# Patient Record
Sex: MALE | Race: WHITE | NOT HISPANIC OR LATINO | Employment: FULL TIME | URBAN - METROPOLITAN AREA
[De-identification: names, ages, dates, MRNs, and addresses within clinical notes are randomized per-mention and may not be internally consistent; named-entity substitution may affect disease eponyms.]

---

## 2023-03-22 LAB — PROSTATE SPECIFIC AG (NG/ML) IN SER/PLAS: <0.1 NG/ML (ref 0–4)

## 2023-07-12 ENCOUNTER — TELEPHONE (OUTPATIENT)
Dept: PRIMARY CARE | Facility: CLINIC | Age: 78
End: 2023-07-12

## 2023-07-31 RX ORDER — MINERAL OIL
1 ENEMA (ML) RECTAL DAILY
COMMUNITY

## 2023-07-31 RX ORDER — MONTELUKAST SODIUM 10 MG/1
1 TABLET ORAL DAILY
COMMUNITY

## 2023-07-31 RX ORDER — MELATONIN 3 MG
1 CAPSULE ORAL NIGHTLY PRN
COMMUNITY
Start: 2023-06-16

## 2023-07-31 RX ORDER — SIMVASTATIN 80 MG/1
40 TABLET, FILM COATED ORAL
COMMUNITY

## 2023-07-31 RX ORDER — VIT A/VIT C/VIT E/ZINC/COPPER 2148-113
1 TABLET ORAL EVERY 12 HOURS
COMMUNITY
Start: 2014-02-18

## 2023-07-31 RX ORDER — FLUNISOLIDE 0.25 MG/ML
2 SOLUTION NASAL
COMMUNITY
Start: 2021-11-18

## 2023-07-31 RX ORDER — TIMOLOL MALEATE 5 MG/ML
SOLUTION/ DROPS OPHTHALMIC EVERY 12 HOURS
COMMUNITY
Start: 2019-03-05

## 2023-08-01 NOTE — PROGRESS NOTES
Subjective   Reason for Visit: Sergio Newton is an 78 y.o. male here for a Medicare Wellness visit.               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    He also was noted to be bradycardic when seeing his primary care physician at the [facility].  He did obtain a cardiology consult in mid June with Dr. Baig  Echocardiogram revealed a moderately enlarged right ventricle but no other abnormalities.  Stress test was negative for ischemia..  The patient reports no chest pain, shortness of breath, headache, presyncope.    The patient reports no paroxysms of sneezing over the past year or so.  He still reports continued chronic intermittent episodes of rhinorrhea times years.  He reports that the episodes usually occur in the morning and resolve after he blows his nose.  No other associated symptoms.    He reports no involuntary nighttime episodes of urinary incontinence over the past year.  Over the past year, he reports fewer episodes of urinary incontinence when standing up quickly or lifting.  He reports no other associated symptoms.    He reports no episodes of pain in the knees over the past several months as he has been more physically active walking up and down stairs at home.    No other new complaints.  Patient Care Team:  Luis Rose MD as PCP - General  Luis Rose MD as PCP - [employer] PCP     Review of Systems  All systems have been reviewed and are normal except as previously noted  Objective   Vitals:  There were no vitals taken for this visit.      Physical Exam  Head-palpation revealed no tenderness over the maxillary or frontal sinuses  Eyes-extraocular movements intact pupils equal and reactive to light; fundi not well visualized.   Ears-palpation of pinnas and tragus revealed no tenderness. External auditory canals not erythematous or swollen.  Right TM not  visualized secondary to impacted cerumen..  Left TM clear  Nose-turbinates not erythematous or swollen; no septal deviation noted  Mouth-posterior pharynx is not erythematous or swollen. Tonsillar pillars appeared normal no exudates  Neck no lymphadenopathy. Thyroid gland not enlarged. , No bruits  Breasts-bilateral gynecomastia noted.  Lungs-clear to auscultation bilaterally  Cardiac-bradycardic, rhythm regular no murmurs no JVD  Abdomen-soft nondistended normal active bowel sounds. There is a 2 x 2 centimeter mass located in the umbilicus. There is a 10 x 10 cm mass located in the epigastrium. Palpation of both regions revealed no tenderness. No tenderness or masses noted throughout the rest of the abdomen. Liver percussed to 9 cm in total span  Pulses 2+ bilaterally  Extremities-no peripheral edema  Musculoskeletal  Cervical spine-no erythema or swelling. Decreased range of motion with no pain on extension 30 degrees.;  as well as bending to the right 30 degrees full range of motion with no pain noted in all other directions of motion.. Palpation revealed no tenderness or increase in warmth.  CMC joint right thumb-  no erythema or swelling, Full range of motion in all directions of motion with no pain. Palpation revealed no tenderness or increase in warmth.  CMC joint left thumb-no erythema or swelling, Full range of motion in all directions of motion with no pain. Palpation revealed no tenderness or increase in warmth  Left hip-no erythema or swelling. Windshield wiper test negative. Full range of motion in all directions of motion with no pain. Palpation revealed no tenderness or increase in warmth    Skin-varicose veins noted in the lower extremities bilaterally.  There is a 6 x 6 cm mass located over the lateral surface of the left shoulder.  Palpation revealed no tenderness or increase in warmth.      Neurologic  Mental status-alert and oriented x3   Cranial nerves-2 through 12 grossly intact, no visual  field abnormalities  Motor-no pronator drift noted, strength-5/5 in all muscle groups tested, , no tremor noted.  No bradykinesia noted.  No rigidity noted.  Negative pull test  Sensory-Light touch sensation fully intact  Pinprick sensation fully intact  Vibratory sensation fully intact.  Cerebellar-no truncal ataxia, good coordination finger-nose testing,, good coordination heel-to-shin testing, normal rapid alternating movements  Slightly positive Romberg, fair coordination in tandem gait,   Reflexes-1+/4 bilaterally  Assessment/Plan   Problem List Items Addressed This Visit    None    Assessment.  Elevated coronary calcium score.  Bradycardia-unsure of etiology.  Could this be a side effect from administration of timolol eyedrops?  COVID-19 June 2022  Chronic intermittent episodes of rhinorrhea,-likely secondary to allergic rhinitis again probably secondary to dust exposure because of the construction that has been occurring in the patient's home.  Allergic rhinitis  Gallstones  Chronic presence of mass in the umbilical region in the epigastrium likely represent hernias  Umbilical hernia  Ventral hernia  Diverticulitis February 2003,? November 2016  Colonic polyps  Chronic intermittent episodes of urinary incontinence only occurring when standing up quickly or lifting-likely secondary to stress incontinence secondary to the patient's prostatectomy  Erectile dysfunction manifested as chronic inability to achieve erections during and in the absence of intercourse- likely secondary to patient's radical prostatectomy  Prostate cancer status post radical prostatectomy April 2010  Hemorrhagic cyst left kidney  Rotator cuff disease right shoulder  Chronic intermittent episodes of sharp or aching pain noted at the CMC joints of both thumbs-may be secondary to osteoarthritis.  Chronic intermittent episodes of burning pain in the left hip region sometime precipitated by exercise-unsure of etiology. May be secondary to left  hip greater trochanteric bursitis,  Left hip greater trochanteric bursitis diagnosed June 2022  Osteoarthritis of the right knee.  Vitamin D deficiency  Hypercholesterolemia.  Lipoma left shoulder   Bilateral cataracts status post removal June 2017  Bilateral episcleritis  Glaucoma  Bilateral macular degeneration  Bilateral Fuchs dystrophy  Chronic intermittent episodes of aching pain and stiffness noted over the posterior surface of the neck-may be secondary to osteoarthritis and degenerative disc disease of the cervical spine, cervical spinal stenosis  Chronic intermittent episodes of, grinding and popping sounds in the neck likely secondary to cervical spinal stenosis, osteoarthritis and degenerative disc disease of the cervical spine  Cervical spinal stenosis  Chronic fatigue-may be secondary to inadequate settings on CPAP device.. Depression may be a contributing factor..   Obstructive sleep apnea  Chronic insomnia manifested as frequent awakenings-probably secondary to depression, PTSD  PTSD  Depression     Plan  Obtain EKG , urinalysis CBC differential CMP fasting lipid profile vitamin D level, vitamin B12 level, PSA next month.  I again urged the patient to take Aleve 440 mg p.o. twice daily as needed, Tylenol 1000 mg p.o. every 6 hours as needed pain, or Advil 400 mg p.o. every 4 to 6 hours as needed pain.,and to apply Voltaren gel to painful regions 4 times per day as needed.       If above lab work unremarkable, I will arrange for an MRI of the brain and cervical spine given the findings on neurological exam.  Patient should return for annual Medicare wellness visit in 1 year

## 2023-08-02 ENCOUNTER — OFFICE VISIT (OUTPATIENT)
Dept: PRIMARY CARE | Facility: CLINIC | Age: 78
End: 2023-08-02
Payer: MEDICARE

## 2023-08-02 ENCOUNTER — LAB (OUTPATIENT)
Dept: LAB | Facility: LAB | Age: 78
End: 2023-08-02
Payer: MEDICARE

## 2023-08-02 VITALS
BODY MASS INDEX: 40.74 KG/M2 | HEART RATE: 54 BPM | SYSTOLIC BLOOD PRESSURE: 120 MMHG | WEIGHT: 230 LBS | DIASTOLIC BLOOD PRESSURE: 76 MMHG

## 2023-08-02 DIAGNOSIS — E66.01 MORBID OBESITY (MULTI): ICD-10-CM

## 2023-08-02 DIAGNOSIS — C61 ADENOCARCINOMA OF PROSTATE (MULTI): ICD-10-CM

## 2023-08-02 DIAGNOSIS — M81.8 ADULT IDIOPATHIC GENERALIZED OSTEOPOROSIS: ICD-10-CM

## 2023-08-02 DIAGNOSIS — Z00.00 ROUTINE GENERAL MEDICAL EXAMINATION AT A HEALTH CARE FACILITY: Primary | ICD-10-CM

## 2023-08-02 DIAGNOSIS — E78.2 HYPERLIPEMIA, MIXED: ICD-10-CM

## 2023-08-02 PROBLEM — E88.810 DYSMETABOLIC SYNDROME: Status: ACTIVE | Noted: 2023-08-02

## 2023-08-02 PROBLEM — N52.9 MALE ERECTILE DISORDER OF ORGANIC ORIGIN: Status: ACTIVE | Noted: 2023-08-02

## 2023-08-02 PROBLEM — R42 DIZZINESS: Status: ACTIVE | Noted: 2023-08-02

## 2023-08-02 PROBLEM — R93.1 ELEVATED CORONARY ARTERY CALCIUM SCORE: Status: ACTIVE | Noted: 2023-08-02

## 2023-08-02 LAB
ALANINE AMINOTRANSFERASE (SGPT) (U/L) IN SER/PLAS: 18 U/L (ref 10–52)
ALBUMIN (G/DL) IN SER/PLAS: 4.1 G/DL (ref 3.4–5)
ALKALINE PHOSPHATASE (U/L) IN SER/PLAS: 57 U/L (ref 33–136)
ANION GAP IN SER/PLAS: 12 MMOL/L (ref 10–20)
ASPARTATE AMINOTRANSFERASE (SGOT) (U/L) IN SER/PLAS: 18 U/L (ref 9–39)
BASOPHILS (10*3/UL) IN BLOOD BY AUTOMATED COUNT: 0.03 X10E9/L (ref 0–0.1)
BASOPHILS/100 LEUKOCYTES IN BLOOD BY AUTOMATED COUNT: 0.5 % (ref 0–2)
BILIRUBIN TOTAL (MG/DL) IN SER/PLAS: 0.8 MG/DL (ref 0–1.2)
CALCIDIOL (25 OH VITAMIN D3) (NG/ML) IN SER/PLAS: 28 NG/ML
CALCIUM (MG/DL) IN SER/PLAS: 8.8 MG/DL (ref 8.6–10.6)
CARBON DIOXIDE, TOTAL (MMOL/L) IN SER/PLAS: 28 MMOL/L (ref 21–32)
CHLORIDE (MMOL/L) IN SER/PLAS: 106 MMOL/L (ref 98–107)
CHOLESTEROL (MG/DL) IN SER/PLAS: 115 MG/DL (ref 0–199)
CHOLESTEROL IN HDL (MG/DL) IN SER/PLAS: 33.2 MG/DL
CHOLESTEROL/HDL RATIO: 3.5
COBALAMIN (VITAMIN B12) (PG/ML) IN SER/PLAS: 820 PG/ML (ref 211–911)
CREATININE (MG/DL) IN SER/PLAS: 0.99 MG/DL (ref 0.5–1.3)
EOSINOPHILS (10*3/UL) IN BLOOD BY AUTOMATED COUNT: 0.29 X10E9/L (ref 0–0.4)
EOSINOPHILS/100 LEUKOCYTES IN BLOOD BY AUTOMATED COUNT: 4.6 % (ref 0–6)
ERYTHROCYTE DISTRIBUTION WIDTH (RATIO) BY AUTOMATED COUNT: 12.9 % (ref 11.5–14.5)
ERYTHROCYTE MEAN CORPUSCULAR HEMOGLOBIN CONCENTRATION (G/DL) BY AUTOMATED: 32.4 G/DL (ref 32–36)
ERYTHROCYTE MEAN CORPUSCULAR VOLUME (FL) BY AUTOMATED COUNT: 94 FL (ref 80–100)
ERYTHROCYTES (10*6/UL) IN BLOOD BY AUTOMATED COUNT: 4.42 X10E12/L (ref 4.5–5.9)
GFR MALE: 78 ML/MIN/1.73M2
GLUCOSE (MG/DL) IN SER/PLAS: 83 MG/DL (ref 74–99)
HEMATOCRIT (%) IN BLOOD BY AUTOMATED COUNT: 41.4 % (ref 41–52)
HEMOGLOBIN (G/DL) IN BLOOD: 13.4 G/DL (ref 13.5–17.5)
IMMATURE GRANULOCYTES/100 LEUKOCYTES IN BLOOD BY AUTOMATED COUNT: 0.2 % (ref 0–0.9)
LDL: 52 MG/DL (ref 0–99)
LEUKOCYTES (10*3/UL) IN BLOOD BY AUTOMATED COUNT: 6.4 X10E9/L (ref 4.4–11.3)
LYMPHOCYTES (10*3/UL) IN BLOOD BY AUTOMATED COUNT: 1.23 X10E9/L (ref 0.8–3)
LYMPHOCYTES/100 LEUKOCYTES IN BLOOD BY AUTOMATED COUNT: 19.3 % (ref 13–44)
MONOCYTES (10*3/UL) IN BLOOD BY AUTOMATED COUNT: 0.74 X10E9/L (ref 0.05–0.8)
MONOCYTES/100 LEUKOCYTES IN BLOOD BY AUTOMATED COUNT: 11.6 % (ref 2–10)
NEUTROPHILS (10*3/UL) IN BLOOD BY AUTOMATED COUNT: 4.06 X10E9/L (ref 1.6–5.5)
NEUTROPHILS/100 LEUKOCYTES IN BLOOD BY AUTOMATED COUNT: 63.8 % (ref 40–80)
NRBC (PER 100 WBCS) BY AUTOMATED COUNT: 0 /100 WBC (ref 0–0)
PLATELETS (10*3/UL) IN BLOOD AUTOMATED COUNT: 202 X10E9/L (ref 150–450)
POC APPEARANCE, URINE: CLEAR
POC BILIRUBIN, URINE: NEGATIVE
POC BLOOD, URINE: NEGATIVE
POC COLOR, URINE: YELLOW
POC GLUCOSE, URINE: NEGATIVE MG/DL
POC KETONES, URINE: NEGATIVE MG/DL
POC LEUKOCYTES, URINE: NEGATIVE
POC NITRITE,URINE: NEGATIVE
POC PH, URINE: 5.5 PH
POC PROTEIN, URINE: ABNORMAL MG/DL
POC SPECIFIC GRAVITY, URINE: 1.02
POC UROBILINOGEN, URINE: 0.2 EU/DL
POTASSIUM (MMOL/L) IN SER/PLAS: 4 MMOL/L (ref 3.5–5.3)
PROSTATE SPECIFIC AG (NG/ML) IN SER/PLAS: <0.1 NG/ML (ref 0–4)
PROTEIN TOTAL: 6.7 G/DL (ref 6.4–8.2)
SODIUM (MMOL/L) IN SER/PLAS: 142 MMOL/L (ref 136–145)
THYROTROPIN (MIU/L) IN SER/PLAS BY DETECTION LIMIT <= 0.05 MIU/L: 3.54 MIU/L (ref 0.44–3.98)
TRIGLYCERIDE (MG/DL) IN SER/PLAS: 148 MG/DL (ref 0–149)
UREA NITROGEN (MG/DL) IN SER/PLAS: 22 MG/DL (ref 6–23)
VLDL: 30 MG/DL (ref 0–40)

## 2023-08-02 PROCEDURE — 1160F RVW MEDS BY RX/DR IN RCRD: CPT | Performed by: INTERNAL MEDICINE

## 2023-08-02 PROCEDURE — 85025 COMPLETE CBC W/AUTO DIFF WBC: CPT

## 2023-08-02 PROCEDURE — 84153 ASSAY OF PSA TOTAL: CPT

## 2023-08-02 PROCEDURE — 80053 COMPREHEN METABOLIC PANEL: CPT

## 2023-08-02 PROCEDURE — 99213 OFFICE O/P EST LOW 20 MIN: CPT | Performed by: INTERNAL MEDICINE

## 2023-08-02 PROCEDURE — 36415 COLL VENOUS BLD VENIPUNCTURE: CPT

## 2023-08-02 PROCEDURE — 82306 VITAMIN D 25 HYDROXY: CPT

## 2023-08-02 PROCEDURE — 84443 ASSAY THYROID STIM HORMONE: CPT

## 2023-08-02 PROCEDURE — 80061 LIPID PANEL: CPT

## 2023-08-02 PROCEDURE — 81002 URINALYSIS NONAUTO W/O SCOPE: CPT | Performed by: INTERNAL MEDICINE

## 2023-08-02 PROCEDURE — 1159F MED LIST DOCD IN RCRD: CPT | Performed by: INTERNAL MEDICINE

## 2023-08-02 PROCEDURE — 82607 VITAMIN B-12: CPT

## 2023-08-02 PROCEDURE — 1170F FXNL STATUS ASSESSED: CPT | Performed by: INTERNAL MEDICINE

## 2023-08-02 RX ORDER — MULTIVITAMIN/IRON/FOLIC ACID 18MG-0.4MG
1 TABLET ORAL DAILY
COMMUNITY

## 2023-08-02 RX ORDER — CHOLECALCIFEROL (VITAMIN D3) 50 MCG
50 TABLET ORAL DAILY
COMMUNITY

## 2023-08-02 ASSESSMENT — ENCOUNTER SYMPTOMS
DEPRESSION: 0
OCCASIONAL FEELINGS OF UNSTEADINESS: 0
LOSS OF SENSATION IN FEET: 0

## 2023-08-02 ASSESSMENT — ACTIVITIES OF DAILY LIVING (ADL)
GROCERY_SHOPPING: INDEPENDENT
DOING_HOUSEWORK: INDEPENDENT
DRESSING: INDEPENDENT
BATHING: INDEPENDENT
MANAGING_FINANCES: INDEPENDENT
TAKING_MEDICATION: INDEPENDENT

## 2023-08-03 DIAGNOSIS — R42 DIZZINESS: ICD-10-CM

## 2023-08-03 DIAGNOSIS — E66.01 MORBID OBESITY (MULTI): Primary | ICD-10-CM

## 2023-08-03 RX ORDER — SEMAGLUTIDE 1.34 MG/ML
0.25 INJECTION, SOLUTION SUBCUTANEOUS
Qty: 1.5 ML | Refills: 3 | Status: SHIPPED | OUTPATIENT
Start: 2023-08-03

## 2023-08-03 RX ORDER — SEMAGLUTIDE 1.34 MG/ML
0.25 INJECTION, SOLUTION SUBCUTANEOUS
COMMUNITY
End: 2023-08-03 | Stop reason: SDUPTHER

## 2023-09-08 ENCOUNTER — TELEPHONE (OUTPATIENT)
Dept: PRIMARY CARE | Facility: CLINIC | Age: 78
End: 2023-09-08
Payer: MEDICARE

## 2023-09-08 DIAGNOSIS — R42 DIZZINESS: Primary | ICD-10-CM

## 2023-09-08 NOTE — TELEPHONE ENCOUNTER
Pt is calling states after his last visit with you, you were looking into test for him because of his balance issues. He is wanting to know how can he get these test done.

## 2023-09-19 LAB — PROSTATE SPECIFIC AG (NG/ML) IN SER/PLAS: <0.1 NG/ML (ref 0–4)

## 2023-09-22 DIAGNOSIS — M48.02 SPINAL STENOSIS OF CERVICAL REGION: Primary | ICD-10-CM

## 2023-09-22 NOTE — PROGRESS NOTES
MRI results reviewed.  There is evidence of bilateral neuroforaminal stenosis in the cervical spine.  I have referred the patient for physical therapy

## 2023-10-08 PROBLEM — J01.00 ACUTE NON-RECURRENT MAXILLARY SINUSITIS: Status: ACTIVE | Noted: 2023-10-08

## 2023-10-08 PROBLEM — G47.33 OBSTRUCTIVE SLEEP APNEA SYNDROME: Status: ACTIVE | Noted: 2017-06-02

## 2023-10-08 PROBLEM — L91.8 OTHER HYPERTROPHIC DISORDERS OF THE SKIN: Status: ACTIVE | Noted: 2023-08-15

## 2023-10-08 PROBLEM — L82.1 OTHER SEBORRHEIC KERATOSIS: Status: ACTIVE | Noted: 2023-08-15

## 2023-10-08 PROBLEM — D22.5 MELANOCYTIC NEVI OF TRUNK: Status: ACTIVE | Noted: 2023-08-15

## 2023-10-08 PROBLEM — M25.511 PAIN IN JOINT OF RIGHT SHOULDER: Status: ACTIVE | Noted: 2023-10-08

## 2023-10-08 PROBLEM — H25.10 CATARACT, NUCLEAR SCLEROTIC SENILE: Status: ACTIVE | Noted: 2017-06-09

## 2023-10-08 PROBLEM — F32.89 OTHER SPECIFIED DEPRESSIVE EPISODES: Status: ACTIVE | Noted: 2023-10-08

## 2023-10-08 PROBLEM — F43.10 PTSD (POST-TRAUMATIC STRESS DISORDER): Status: ACTIVE | Noted: 2017-06-02

## 2023-10-08 PROBLEM — G47.19 EXCESSIVE DAYTIME SLEEPINESS: Status: ACTIVE | Noted: 2023-10-08

## 2023-10-08 PROBLEM — L81.4 OTHER MELANIN HYPERPIGMENTATION: Status: ACTIVE | Noted: 2023-08-15

## 2023-10-08 PROBLEM — H35.3120 NONEXUDATIVE AGE-RELATED MACULAR DEGENERATION OF LEFT EYE: Status: ACTIVE | Noted: 2017-06-23

## 2023-10-08 PROBLEM — R07.89 CHEST DISCOMFORT: Status: ACTIVE | Noted: 2023-10-08

## 2023-10-08 PROBLEM — E88.810 METABOLIC SYNDROME: Status: ACTIVE | Noted: 2023-10-08

## 2023-10-08 PROBLEM — N28.1 CYST, KIDNEY, ACQUIRED: Status: ACTIVE | Noted: 2023-10-08

## 2023-10-08 PROBLEM — D17.22 BENIGN LIPOMATOUS NEOPLASM OF SKIN AND SUBCUTANEOUS TISSUE OF LEFT ARM: Status: ACTIVE | Noted: 2023-10-08

## 2023-10-08 PROBLEM — Z85.46 HISTORY OF PROSTATE CANCER: Status: ACTIVE | Noted: 2017-06-02

## 2023-10-08 PROBLEM — D18.01 HEMANGIOMA OF SKIN AND SUBCUTANEOUS TISSUE: Status: ACTIVE | Noted: 2023-08-15

## 2023-10-08 PROBLEM — F41.9 ANXIETY AND DEPRESSION: Status: ACTIVE | Noted: 2017-06-02

## 2023-10-08 PROBLEM — G47.10 HYPERSOMNIA: Status: ACTIVE | Noted: 2021-06-24

## 2023-10-08 PROBLEM — M79.644 BILATERAL THUMB PAIN: Status: ACTIVE | Noted: 2023-10-08

## 2023-10-08 PROBLEM — F32.A ANXIETY AND DEPRESSION: Status: ACTIVE | Noted: 2017-06-02

## 2023-10-08 PROBLEM — D48.5 NEOPLASM OF UNCERTAIN BEHAVIOR OF SKIN: Status: ACTIVE | Noted: 2023-08-15

## 2023-10-08 PROBLEM — D04.21 CARCINOMA IN SITU OF SKIN OF RIGHT EAR AND EXTERNAL AURICULAR CANAL: Status: ACTIVE | Noted: 2023-08-15

## 2023-10-08 PROBLEM — M79.645 BILATERAL THUMB PAIN: Status: ACTIVE | Noted: 2023-10-08

## 2023-10-08 PROBLEM — R00.1 SINUS BRADYCARDIA: Status: ACTIVE | Noted: 2023-10-08

## 2023-10-08 PROBLEM — M70.60 TROCHANTERIC BURSITIS: Status: ACTIVE | Noted: 2023-10-08

## 2023-10-08 PROBLEM — D22.39 MELANOCYTIC NEVI OF OTHER PARTS OF FACE: Status: ACTIVE | Noted: 2023-08-15

## 2023-10-08 RX ORDER — KETOTIFEN FUMARATE 0.35 MG/ML
1 SOLUTION/ DROPS OPHTHALMIC 2 TIMES DAILY
COMMUNITY
Start: 2020-06-12

## 2023-10-08 RX ORDER — AZELASTINE HYDROCHLORIDE 0.5 MG/ML
1 SOLUTION/ DROPS OPHTHALMIC 2 TIMES DAILY PRN
COMMUNITY
Start: 2018-08-30

## 2023-10-08 RX ORDER — PSYLLIUM HUSK 3.4 G/5.8G
POWDER ORAL
COMMUNITY
Start: 2023-06-16

## 2023-10-08 RX ORDER — SIMVASTATIN 40 MG/1
20 TABLET, FILM COATED ORAL DAILY
COMMUNITY

## 2023-10-08 RX ORDER — ECONAZOLE NITRATE 10 MG/G
1 CREAM TOPICAL 2 TIMES DAILY
COMMUNITY
Start: 2019-09-20

## 2023-10-08 RX ORDER — CYANOCOBALAMIN (VITAMIN B-12) 500 MCG
1 TABLET ORAL NIGHTLY
COMMUNITY

## 2023-10-08 RX ORDER — MULTIVITAMIN
1 TABLET ORAL DAILY
COMMUNITY
Start: 2005-10-11

## 2023-10-08 RX ORDER — ASPIRIN 81 MG/1
1 TABLET ORAL DAILY
COMMUNITY
Start: 2005-10-11

## 2023-10-08 RX ORDER — PRAZOSIN HYDROCHLORIDE 1 MG/1
1 CAPSULE ORAL DAILY
COMMUNITY
Start: 2010-01-08

## 2023-10-08 RX ORDER — AZELASTINE 1 MG/ML
2 SPRAY, METERED NASAL 2 TIMES DAILY PRN
COMMUNITY
Start: 2018-08-30

## 2023-10-08 RX ORDER — AMOXICILLIN AND CLAVULANATE POTASSIUM 875; 125 MG/1; MG/1
1 TABLET, FILM COATED ORAL
COMMUNITY
Start: 2023-01-31

## 2023-10-08 RX ORDER — FLUTICASONE PROPIONATE 50 MCG
2 SPRAY, SUSPENSION (ML) NASAL DAILY
COMMUNITY

## 2023-10-10 ENCOUNTER — EVALUATION (OUTPATIENT)
Dept: PHYSICAL THERAPY | Facility: CLINIC | Age: 78
End: 2023-10-10
Payer: MEDICARE

## 2023-10-10 DIAGNOSIS — M54.2 PAIN, NECK: ICD-10-CM

## 2023-10-10 DIAGNOSIS — M54.50 LOW BACK PAIN, UNSPECIFIED BACK PAIN LATERALITY, UNSPECIFIED CHRONICITY, UNSPECIFIED WHETHER SCIATICA PRESENT: Primary | ICD-10-CM

## 2023-10-10 DIAGNOSIS — M48.02 STENOSIS OF CERVICAL SPINE: ICD-10-CM

## 2023-10-10 PROCEDURE — 97162 PT EVAL MOD COMPLEX 30 MIN: CPT | Mod: GP

## 2023-10-10 NOTE — PROGRESS NOTES
Physical Therapy  Physical Therapy Orthopedic Evaluation    Patient Name: Sergio Newton  MRN: [id number]  Today's Date: 10/10/2023       Insurance:  Visit number: 1 of 10  Authorization info: copay  Insurance Type: [employer]    Current Problem  Back pain  Stenosis  Neck pain    Referred by: jamey gomez    General:  Things have changed since I saw the doctor. History of not being able to turn head. But now the low back is more of an issue. Experiencing pain on outside of calves.       Precautions: h/o cancer     Medical History Form: Reviewed (scanned into chart) PMH bradycardia, metabolic syndrome, h/o anxiety, h/o PTSD, h/o dizziness    Subjective:   Subjective   Chief Complaint: low back pain  Onset: 1 month  DAVID: 1 month ago rebuilding walk wall, lifting stones    Current Condition:   Worse    Pain:     Location: history of ruptured disc with exacerbations. Good for many years. This exacerbation without cause.  Description: burning on outside of legs, stabbing pain on outside of back. Denies numb and tingling.  Aggravating Factors:  lifting, bending over to pick things up off floor, yard work, stairs difficult when back flared up. Worse when wakes up  Relieving Factors:  Sitting, lying down  MATTRESS: greater than 10 years  SLEEP: not disrupted by pain  IMAGING CERVICAL MRI: moderate to severe canal stenosis.    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 No night sweats - notes some mild balance issues.  Fever/chills, unexplained weight changes, dizziness/fainting, unexplained change in bowel or bladder functions, unexplained malaise or muscle weakness, night pain/sweats, numbness or tingling    Objective:  Objective       CERVICAL ACTIVE RANGE OF MOTION:  FORWARD FLEXION: 100  EXTENSION:        10             SIDE BEND RIGHT: 10  SIDE BEND LEFT: 10  ROTATION RIGHT: 25  ROTATION LEFT:25    LUMBAR AROM:   FB finger 5\" to floor  BB 25  RSB to patella  LSB 5\" above patella  RR 75  LR 75  .    STRENGTH TESTING:    STRENGTH TESTING:    HIP  ------                                                                       FLEXION:       R       4       /5            L        5      /5  KNEE  -------  EXTENSION     R           4-   /5           L        5     /5  FLEXION          R         4     /5           L         5    /5    ANKLE  -------  DF                    R      4       /5           L      5      /5  FUNCTIONAL  -----   SIT TO STAND FUNCTIONAL ASSESSMENT--  30 SECOND SIT TO STAND:      12 reps                           MODIFICATIONS: none    CUT-OFF SCORES FOR 30 SECOND SIT TO STAND---  Moderately Active Older Adults:  Normative data published in Ria and Alvaro 1999b  (Ria and Alvaro, 2013; n = 2140 moderately active older adults)  Criterion fitness standards to maintain physical independence  Age 60-64 65-69 70-74 75-79 80-84 85-89 90-94  Women 15 15 14 13 12 11 9  Men 17 16 15 14 13 11 9    TANDEM STANCE: 15 sec  SINGLE LEG STANCE: 15 sec          Posture: decreased cervical lordosis  Elevated shoulders  Rounded shoulders  Decreased lumbar lordosis      LUMBAR  SPECIAL TESTS    INSTABILITY  Active SLR test  Prone instability test    STENOSIS  Bilat leg symptoms+  Leg pain greater than back pain varies  Pain during walking or standing +  Pain relief with sitting +  Age " greater than 48 +    RADICULOPATHY / DISC HERNIATION  Slump test - feels tight negative    FLEXIBILITY  Gastroc limited  Hams min limited  Piriformis R mod limited        COMMENTS     Outcome Measures: Oswestry 22%        Treatments:      EDUCATION:  plan of care, activity modifications, pain management, and injury pathology  Education  Individual(s) Educated: Patient  Education Provided: POC  Risk and Benefits Discussed with Patient/Caregiver/Other: yes  Patient/Caregiver Demonstrated Understanding: yes  Plan of Care Discussed and Agreed Upon: yes  Patient Response to Education: Patient/Caregiver Verbalized Understanding of Information    Assessment: Patient presents with signs and symptoms consistent with stenosis, resulting in limited participation in pain-free ADLs and inability to perform at their prior level of function. Pt would benefit from physical therapy to address the impairments found & listed previously in the objective section in order to return to safe and pain-free ADLs and prior level of function.  PT Assessment Results: Decreased strength, Decreased range of motion, Pain  Rehab Prognosis: Fair  Evaluation/Treatment Tolerance: Patient tolerated treatment well  Strengths: Ability to acquire knowledge    Plan:  Treatment/Interventions: Cryotherapy, Education/ Instruction, Hot pack, Manual therapy, Therapeutic activities, Therapeutic exercises  PT Plan: Skilled PT  PT Frequency: Other (Comment)  Duration: 1-2x/week  Onset Date: 09/10/23  Certification Period Start Date: 10/10/23  Certification Period End Date: 01/08/24  Number of Treatments Authorized: medical necessity  Rehab Potential: Fair  Plan of Care Agreement: Patient  Planned Interventions include: therapeutic exercise, self-care home management, manual therapy, therapeutic activities, gait training, neuromuscular coordination, vasopneumatic, dry needling, aquatic therapy  Frequency: 1-2 x Week  Duration: 12 Weeks  Goals: Set and discussed  today  As a measure of increased function Oswestry improves by 6 points or perceived function improves by 10%  LE strength will improve by 1/3 MMT grade to improve functional activities   LAROM will improve 10% where limited with min/no reports of pain  Patient will be independent and compliant with HEP measured by independent recall of 2 or more exercises.     Plan of care was developed with input and agreement by the patient      Fernando Villalobos, PT DPT

## 2023-10-10 NOTE — LETTER
October 10, 2023     Patient: Sergio Newton   YOB: 1945   Date of Visit: 10/10/2023       To Whom It May Concern:    It is my medical opinion that Sergio Newton {Work release (duty restriction):61762}.    If you have any questions or concerns, please don't hesitate to call.         Sincerely,        Fernando Villalobos, PT    CC: No Recipients

## 2023-10-10 NOTE — LETTER
October 10, 2023     Patient: Sergio Newton   YOB: 1945   Date of Visit: 10/10/2023       To Whom it May Concern:    Sergio Newton was seen in my clinic on 10/10/2023. He {Return to school/sport:80626}.    If you have any questions or concerns, please don't hesitate to call.         Sincerely,          Fernando Villalobos, PT        CC: No Recipients

## 2023-10-17 ENCOUNTER — TREATMENT (OUTPATIENT)
Dept: PHYSICAL THERAPY | Facility: CLINIC | Age: 78
End: 2023-10-17
Payer: MEDICARE

## 2023-10-17 DIAGNOSIS — M54.50 LOW BACK PAIN: ICD-10-CM

## 2023-10-17 DIAGNOSIS — M48.02 SPINAL STENOSIS OF CERVICAL REGION: Primary | ICD-10-CM

## 2023-10-17 DIAGNOSIS — M54.50 ACUTE BILATERAL LOW BACK PAIN, UNSPECIFIED WHETHER SCIATICA PRESENT: ICD-10-CM

## 2023-10-17 PROCEDURE — 97110 THERAPEUTIC EXERCISES: CPT | Mod: GP,CQ

## 2023-10-17 NOTE — PROGRESS NOTES
"    Patient Name: Sergio Newton  MRN Number: [id number]  Initial Examination Date: 10/10/23  Referring Provider: Dr. Rose  ONSET DATE:  9/10/23    Today's Date: 10/17/2023  Time Calculation  Start Time: 0915  Stop Time: 1001  Time Calculation (min): 46 min    Insurance:  Visit Number: 2  Approved Visits: 10  Insurance Auth Dates: [place]  CMS Certification Period: 10/10/23 - 1/8/24    Precautions:  h/o cancer  Medical History Form: Reviewed (scanned into chart) PMH bradycardia, metabolic syndrome, h/o anxiety, h/o PTSD, h/o dizziness    PT Clinical Problem:  Back pain  Stenosis  Neck pain    Problem List  1. Spinal stenosis of cervical region  Referral to Physical Therapy      2. Acute bilateral low back pain, unspecified whether sciatica present        3. Low back pain            Subjective:    Objective: At EVAL:  LUMBAR AROM:   FB finger 5\" to floor  BB 25  RSB to patella  LSB 5\" above patella  RR 75  LR 75  STRENGTH TESTING:    HIP  ------                                                                       FLEXION:       R       4       /5            L        5      /5  KNEE  -------  EXTENSION     R           4-   /5           L        5     /5  FLEXION          R         4     /5           L         5    /5    ANKLE  -------  DF                    R      4       /5           L      5      /5  TANDEM STANCE: 15 sec  SINGLE LEG STANCE: 15 sec    SIT TO STAND FUNCTIONAL ASSESSMENT--  30 SECOND SIT TO STAND:      12 reps                           MODIFICATIONS: none    Outcome Measure:     Treatment:   Rec Bike x 5 min (slight increase in L calf burning that decreased after getting off bike)  Seated HS stretch 2 x 30\"   Seated lumbar stretch with GSB 5\" x 10   Piriformis stretch 2 x 30\" B  LTR x 10   PPT with hip add x 10   PPT with hip abd BTB x 10     Home Program/Education: [id number]    Assessment:  Patient tolerated session well.  Decreased pain and no burning in calves when leaving.  Patient was given HEP and BTB " today.     Plan  Treatment/Interventions: Cryotherapy, Education/ Instruction, Hot pack, Manual therapy, Therapeutic activities, Therapeutic exercises  PT Plan: Skilled PT  PT Frequency: 1-2x/week  Duration: 12 weeks     Care Plan/Goals:    Active       PT Problem       PT Goal 1       Start:  10/17/23       1. As a measure of increased function Oswestry improves by 6 points or perceived function improves by 10%  2. LE strength will improve by 1/3 MMT grade to improve functional activities   3. LAROM will improve 10% where limited with min/no reports of pain  4. Patient will be independent and compliant with HEP measured by independent recall of 2 or more exercises. ?    Plan of care was developed with input and agreement by the patientPhysical Therapy Examination and Treatment Note

## 2023-10-24 ENCOUNTER — TREATMENT (OUTPATIENT)
Dept: PHYSICAL THERAPY | Facility: CLINIC | Age: 78
End: 2023-10-24
Payer: MEDICARE

## 2023-10-24 DIAGNOSIS — M48.02 STENOSIS OF CERVICAL SPINE: ICD-10-CM

## 2023-10-24 DIAGNOSIS — M54.50 ACUTE BILATERAL LOW BACK PAIN, UNSPECIFIED WHETHER SCIATICA PRESENT: ICD-10-CM

## 2023-10-24 DIAGNOSIS — M54.2 PAIN, NECK: ICD-10-CM

## 2023-10-24 DIAGNOSIS — M54.50 LOW BACK PAIN: ICD-10-CM

## 2023-10-24 DIAGNOSIS — M48.02 SPINAL STENOSIS OF CERVICAL REGION: Primary | ICD-10-CM

## 2023-10-24 PROCEDURE — 97110 THERAPEUTIC EXERCISES: CPT | Mod: GP

## 2023-10-24 PROCEDURE — 97110 THERAPEUTIC EXERCISES: CPT | Mod: GP | Performed by: PHYSICAL THERAPIST

## 2023-10-24 NOTE — PROGRESS NOTES
"    Patient Name: Sergio Newton  MRN Number: [id number]  Initial Examination Date: 10/10/23  Referring Provider: Dr. Rose  ONSET DATE:  9/10/23    Today's Date: 10/24/2023  Time Calculation  Start Time: 0907  Stop Time: 0948  Time Calculation (min): 41 min    Insurance:  Visit Number: 3  Approved Visits: 10  Insurance Auth Dates: [place]  CMS Certification Period: 10/10/23 - 1/8/24    Precautions:  h/o cancer  Medical History Form: Reviewed (scanned into chart) PMH bradycardia, metabolic syndrome, h/o anxiety, h/o PTSD, h/o dizziness    PT Clinical Problem:  Back pain  Stenosis  Neck pain    Problem List  1. Spinal stenosis of cervical region        2. Acute bilateral low back pain, unspecified whether sciatica present        3. Low back pain        4. Pain, neck        5. Stenosis of cervical spine              Subjective: back okay until I do something like yard work. Needs to sit about 5 min then pain improves. No pain today.     Objective: At EVAL:  LUMBAR AROM:   FB finger 5\" to floor  BB 25  RSB to patella  LSB 5\" above patella  RR 75  LR 75  STRENGTH TESTING:    HIP  ------                                                                       FLEXION:       R       4       /5            L        5      /5  KNEE  -------  EXTENSION     R           4-   /5           L        5     /5  FLEXION          R         4     /5           L         5    /5    ANKLE  -------  DF                    R      4       /5           L      5      /5  TANDEM STANCE: 15 sec  SINGLE LEG STANCE: 15 sec    SIT TO STAND FUNCTIONAL ASSESSMENT--  30 SECOND SIT TO STAND:      12 reps                           MODIFICATIONS: none    Outcome Measure:     Treatment:   Education - HEP performance before and after yardwork  Rec Bike x 5 min Seated cat/cow 5 sec each 8 times  Seated trunk flexion 10sec   Seated 3 way prayer stretch 20 sec 2 each with green SB  Piriformis stretch 2 x 30\" B  LTR x 10   DKTC SB10 sec 5x  SB PPT TA 5 sec 10x with " "tactile and verbal cues  PPT with hip add x 10 5 sec  PPT with hip abd BTB x 10 supine  S/l clamshell BTB R 2x10  LAQ 3sec 3# 2x10  Stand ham curl 3# 2x10  Sit stand x5 then 5# 5x  Stair HS stretch 2 x 30\"      Home Program/Education: [id number]    Assessment:  Patient tolerated session well.  Benefits from verbal cues and skilled supervision.  Plan  NEXT VISIT: progress weight with sit stand  Treatment/Interventions: Cryotherapy, Education/ Instruction, Hot pack, Manual therapy, Therapeutic activities, Therapeutic exercises  PT Plan: Skilled PT  PT Frequency: 1-2x/week  Duration: 12 weeks     Care Plan/Goals:    Active       PT Problem       PT Goal 1       Start:  10/17/23       1. As a measure of increased function Oswestry improves by 6 points or perceived function improves by 10%  2. LE strength will improve by 1/3 MMT grade to improve functional activities   3. LAROM will improve 10% where limited with min/no reports of pain  4. Patient will be independent and compliant with HEP measured by independent recall of 2 or more exercises. ?    Plan of care was developed with input and agreement by the patientPhysical Therapy Examination and Treatment Note           "

## 2023-10-26 NOTE — PROGRESS NOTES
"    Patient Name: Sergio Newton  MRN Number: [id number]  Initial Examination Date: 10/10/23  Referring Provider: Dr. Rose  ONSET DATE:  9/10/23    Today's Date: 10/27/2023  Time Calculation  Start Time: 0800  Stop Time: 0842  Time Calculation (min): 42 min    Insurance:  Visit Number: 4  Approved Visits: 10  Insurance Auth Dates: [place]  CMS Certification Period: 10/10/23 - 1/8/24    Precautions:  h/o cancer  Medical History Form: Reviewed (scanned into chart) PMH bradycardia, metabolic syndrome, h/o anxiety, h/o PTSD, h/o dizziness    PT Clinical Problem:  Back pain  Stenosis  Neck pain    Problem List  1. Spinal stenosis of cervical region        2. Acute bilateral low back pain, unspecified whether sciatica present        3. Low back pain                Subjective: felt fantastic after last session even the following day. Did yardwork and notes increased stiffness/soreness but not pain.    Objective: At EVAL:  LUMBAR AROM:   FB finger 5\" to floor  BB 25  RSB to patella  LSB 5\" above patella  RR 75  LR 75  STRENGTH TESTING:    HIP  ------                                                                       FLEXION:       R       4       /5            L        5      /5  KNEE  -------  EXTENSION     R           4-   /5           L        5     /5  FLEXION          R         4     /5           L         5    /5    ANKLE  -------  DF                    R      4       /5           L      5      /5  TANDEM STANCE: 15 sec  SINGLE LEG STANCE: 15 sec    SIT TO STAND FUNCTIONAL ASSESSMENT--  30 SECOND SIT TO STAND:      12 reps                           MODIFICATIONS: none    Outcome Measure:     Treatment:   Rec Bike x 5 min       Seated cat/cow 5 sec each 5 times  SB rhythmically FB/rocking 10x  Seated 3 way prayer stretch 20 sec 3 each with green SB    Piriformis stretch 2 x 30\" B  LTR x 10   DKTC SB  10x  PPT with hip add x 10 5 sec  TA hip add with bridge 5x  MHP with MAGDI no charge    MANUAL: percussive massage to " lumbo-sacral region in sidelying  Home Program/Education: [id number]    Assessment:  Episodes of discomfort and catching during MAGDI today. Symptoms alleviated after manual. May benefit from use of HP at home.      Plan  NEXT VISIT: progress weight with sit stand, consider progressing core standing MAGDI  Treatment/Interventions: Cryotherapy, Education/ Instruction, Hot pack, Manual therapy, Therapeutic activities, Therapeutic exercises  PT Plan: Skilled PT  PT Frequency: 1-2x/week  Duration: 12 weeks     Care Plan/Goals:    Active       PT Problem       PT Goal 1       Start:  10/17/23       1. As a measure of increased function Oswestry improves by 6 points or perceived function improves by 10%  2. LE strength will improve by 1/3 MMT grade to improve functional activities   3. LAROM will improve 10% where limited with min/no reports of pain  4. Patient will be independent and compliant with HEP measured by independent recall of 2 or more exercises. ?    Plan of care was developed with input and agreement by the patientPhysical Therapy Examination and Treatment Note

## 2023-10-27 ENCOUNTER — TREATMENT (OUTPATIENT)
Dept: PHYSICAL THERAPY | Facility: CLINIC | Age: 78
End: 2023-10-27
Payer: MEDICARE

## 2023-10-27 DIAGNOSIS — M54.50 ACUTE BILATERAL LOW BACK PAIN, UNSPECIFIED WHETHER SCIATICA PRESENT: ICD-10-CM

## 2023-10-27 DIAGNOSIS — M54.50 LOW BACK PAIN: ICD-10-CM

## 2023-10-27 DIAGNOSIS — M48.02 SPINAL STENOSIS OF CERVICAL REGION: Primary | ICD-10-CM

## 2023-10-27 PROCEDURE — 97110 THERAPEUTIC EXERCISES: CPT | Mod: GP

## 2023-10-27 PROCEDURE — 97530 THERAPEUTIC ACTIVITIES: CPT | Mod: GP

## 2023-10-31 ENCOUNTER — TREATMENT (OUTPATIENT)
Dept: PHYSICAL THERAPY | Facility: CLINIC | Age: 78
End: 2023-10-31
Payer: MEDICARE

## 2023-10-31 DIAGNOSIS — M54.50 ACUTE BILATERAL LOW BACK PAIN, UNSPECIFIED WHETHER SCIATICA PRESENT: ICD-10-CM

## 2023-10-31 DIAGNOSIS — M48.02 STENOSIS OF CERVICAL SPINE: Primary | ICD-10-CM

## 2023-10-31 PROCEDURE — 97110 THERAPEUTIC EXERCISES: CPT | Mod: GP,CQ

## 2023-10-31 NOTE — PROGRESS NOTES
"    Patient Name: Sergio Newton  MRN Number: [id number]  Initial Examination Date: 10/10/23  Referring Provider: Dr. Rose  ONSET DATE:  9/10/23    Today's Date: 10/31/2023  Time Calculation  Start Time: 1000  Stop Time: 1042  Time Calculation (min): 42 min    Insurance:  Visit Number: 5  Approved Visits: 10  [place] Auth Dates: [place]  CMS Certification Period: 10/10/23 - 1/8/24    Precautions:  h/o cancer  Medical History Form: Reviewed (scanned into chart) PMH bradycardia, metabolic syndrome, h/o anxiety, h/o PTSD, h/o dizziness    PT Clinical Problem:  Back pain  Stenosis  Neck pain    Problem List  1. Stenosis of cervical spine        2. Acute bilateral low back pain, unspecified whether sciatica present                Subjective:   Pt felt good after last tx, stated I over worked a little and was sore but diminished the next day. Today feels fine  0/10 prior to session    Objective: At EVAL:  LUMBAR AROM:   FB finger 5\" to floor  BB 25  RSB to patella  LSB 5\" above patella  RR 75  LR 75  STRENGTH TESTING:    HIP  ------                                                                       FLEXION:       R       4       /5            L        5      /5  KNEE  -------  EXTENSION     R           4-   /5           L        5     /5  FLEXION          R         4     /5           L         5    /5    ANKLE  -------  DF                    R      4       /5           L      5      /5  TANDEM STANCE: 15 sec  SINGLE LEG STANCE: 15 sec    SIT TO STAND FUNCTIONAL ASSESSMENT--  30 SECOND SIT TO STAND:      12 reps                           MODIFICATIONS: none    Outcome Measure:     Treatment:   Rec Bike Random (Low setting) x 5 min  Resisted punch out 2 x10 Each direction GTB  Standing chops 2# mediball x10 each direction  STS x5, 2 x10 x5# mediball  Seated hamstring stretch Bx60\"  Resisted trunk rotation x10 ea direction    MANUAL: percussive massage to lumbo-sacral region in sidelying (Bilateral)  Home Program/Education: " "[id number] (Updated 10/31)    Assessment:  Pt tolerated tx without complaints of pain. Vc for TA activation during activity. Tenderness noted in glutes/ piriformis.    Plan  NEXT VISIT: assess pt response to progression of core strengthening.  Treatment/Interventions: Cryotherapy, Education/ Instruction, Hot pack, Manual therapy, Therapeutic activities, Therapeutic exercises  PT Plan: Skilled PT  PT Frequency: 1-2x/week  Duration: 12 weeks     Care Plan/Goals:    Active       PT Problem       PT Goal 1       Start:  10/17/23       1. As a measure of increased function Oswestry improves by 6 points or perceived function improves by 10%  2. LE strength will improve by 1/3 MMT grade to improve functional activities   3. LAROM will improve 10% where limited with min/no reports of pain  4. Patient will be independent and compliant with HEP measured by independent recall of 2 or more exercises. ?    Plan of care was developed with input and agreement by the patientPhysical Therapy Examination and Treatment Note             Patient Name: Sergio Newton  MRN Number: [id number]  Initial Examination Date: 10/10/23  Referring Provider: Dr. Rose  ONSET DATE:  9/10/23    Today's Date: 10/27/2023  Time Calculation  Start Time: 1000  Stop Time: 1042  Time Calculation (min): 42 min    Insurance:  Visit Number: 4  Approved Visits: 10  [place] Auth Dates: [place]  CMS Certification Period: 10/10/23 - 1/8/24    Precautions:  h/o cancer  Medical History Form: Reviewed (scanned into chart) PMH bradycardia, metabolic syndrome, h/o anxiety, h/o PTSD, h/o dizziness    PT Clinical Problem:  Back pain  Stenosis  Neck pain    Problem List  1. Stenosis of cervical spine        2. Acute bilateral low back pain, unspecified whether sciatica present                Subjective: felt fantastic after last session even the following day. Did yardwork and notes increased stiffness/soreness but not pain.    Objective: At EVAL:  LUMBAR AROM:   FB finger 5\" to " "floor  BB 25  RSB to patella  LSB 5\" above patella  RR 75  LR 75  STRENGTH TESTING:    HIP  ------                                                                       FLEXION:       R       4       /5            L        5      /5  KNEE  -------  EXTENSION     R           4-   /5           L        5     /5  FLEXION          R         4     /5           L         5    /5    ANKLE  -------  DF                    R      4       /5           L      5      /5  TANDEM STANCE: 15 sec  SINGLE LEG STANCE: 15 sec    SIT TO STAND FUNCTIONAL ASSESSMENT--  30 SECOND SIT TO STAND:      12 reps                           MODIFICATIONS: none    Outcome Measure:     Treatment:   Rec Bike x 5 min       Seated cat/cow 5 sec each 5 times  SB rhythmically FB/rocking 10x  Seated 3 way prayer stretch 20 sec 3 each with green SB    Piriformis stretch 2 x 30\" B  LTR x 10   DKTC SB  10x  PPT with hip add x 10 5 sec  TA hip add with bridge 5x  MHP with MAGDI no charge    MANUAL: percussive massage to lumbo-sacral region in sidelying  Home Program/Education: [id number]    Assessment:  Episodes of discomfort and catching during MAGDI today. Symptoms alleviated after manual. May benefit from use of HP at home.      Plan  NEXT VISIT: progress weight with sit stand, consider progressing core standing MAGDI  Treatment/Interventions: Cryotherapy, Education/ Instruction, Hot pack, Manual therapy, Therapeutic activities, Therapeutic exercises  PT Plan: Skilled PT  PT Frequency: 1-2x/week  Duration: 12 weeks     Care Plan/Goals:    Active       PT Problem       PT Goal 1       Start:  10/17/23       1. As a measure of increased function Oswestry improves by 6 points or perceived function improves by 10%  2. LE strength will improve by 1/3 MMT grade to improve functional activities   3. LAROM will improve 10% where limited with min/no reports of pain  4. Patient will be independent and compliant with HEP measured by independent recall of 2 or more exercises. " ?    Plan of care was developed with input and agreement by the patientPhysical Therapy Examination and Treatment Note

## 2023-11-03 ENCOUNTER — APPOINTMENT (OUTPATIENT)
Dept: PHYSICAL THERAPY | Facility: CLINIC | Age: 78
End: 2023-11-03
Payer: MEDICARE

## 2023-11-10 ENCOUNTER — TREATMENT (OUTPATIENT)
Dept: PHYSICAL THERAPY | Facility: CLINIC | Age: 78
End: 2023-11-10
Payer: MEDICARE

## 2023-11-10 DIAGNOSIS — M54.50 ACUTE BILATERAL LOW BACK PAIN, UNSPECIFIED WHETHER SCIATICA PRESENT: Primary | ICD-10-CM

## 2023-11-10 PROCEDURE — 97110 THERAPEUTIC EXERCISES: CPT | Mod: GP

## 2023-11-10 PROCEDURE — 97530 THERAPEUTIC ACTIVITIES: CPT | Mod: GP

## 2023-11-10 NOTE — PROGRESS NOTES
"    Patient Name: Sergio Newton  MRN Number: [id number]  Initial Examination Date: 10/10/23  Referring Provider: Dr. Rose  ONSET DATE:  9/10/23    Today's Date: 11/10/2023  Time Calculation  Start Time: 0900  Stop Time: 0945  Time Calculation (min): 45 min    Insurance:  Visit Number: 6  Approved Visits: 10  [place] Auth Dates: [place]  CMS Certification Period: 10/10/23 - 1/8/24    Precautions:  h/o cancer  Medical History Form: Reviewed (scanned into chart) PMH bradycardia, metabolic syndrome, h/o anxiety, h/o PTSD, h/o dizziness    PT Clinical Problem:  Back pain  Stenosis  Neck pain    Problem List  1. Acute bilateral low back pain, unspecified whether sciatica present                    Subjective:   Feels like he is improving  Able to do yardwork. At end of day has some tightness in back but pain nowhere near as intense as previously. May be ready to be discharged to [facility]. Rates function at 80-85%    Objective: 11/10/23:  LUMBAR AROM:   FB finger 4 \" to floor  BB 25  RSB wnl  LSB wnl  RR wnl  LR wnl  STRENGTH TESTING:    HIP  ------                                                                       FLEXION:       R       5      /5            L        5      /5  KNEE  -------  EXTENSION     R           5  /5           L        5     /5  FLEXION          R         4+     /5           L         5    /5    ANKLE  -------  DF                    R      5     /5           L      5      /5  Outcome Measure: PSFS: 80-85%    Treatment:   Rec Bike Random (Low setting) x 5 min  Gastroc incline 3 x30 sec  HEP review    TA:  As noted above.  Educated at length on self management techniques and HEP including resources for community exercise to incorporate lifestyle changes with positive impact on long term health and chronic disease management.     Assessment:  All goals achieved and patient demonstrates good understanding of benefits related to long term compliance with exercise. Patient pleased with progress to date and is " agreeable to discharge at this time.     Plan    Care Plan/Goals:    Active       PT Problem       PT Goal 1       Start:  10/17/23       1. As a measure of increased function Oswestry improves by 6 points or perceived function improves by 10%  2. LE strength will improve by 1/3 MMT grade to improve functional activities   3. LAROM will improve 10% where limited with min/no reports of pain  4. Patient will be independent and compliant with HEP measured by independent recall of 2 or more exercises. ?    Plan of care was developed with input and agreement by the patientPhysical Therapy Examination and Treatment Note               Date of discharge: 11/10/23  Date of last visit: 11/10/23  Number of attended visits: 6      Therapy addressed the following problems/issues: back pain    Reason for discharge:    +achieved all/most significant goals

## 2024-02-09 ENCOUNTER — OFFICE VISIT (OUTPATIENT)
Dept: DERMATOLOGY | Facility: CLINIC | Age: 79
End: 2024-02-09
Payer: COMMERCIAL

## 2024-02-09 DIAGNOSIS — L30.9 DERMATITIS: ICD-10-CM

## 2024-02-09 DIAGNOSIS — L21.9 SEBORRHEIC DERMATITIS: ICD-10-CM

## 2024-02-09 DIAGNOSIS — D22.9 MULTIPLE BENIGN NEVI: Primary | ICD-10-CM

## 2024-02-09 PROCEDURE — 1159F MED LIST DOCD IN RCRD: CPT | Performed by: STUDENT IN AN ORGANIZED HEALTH CARE EDUCATION/TRAINING PROGRAM

## 2024-02-09 PROCEDURE — 99214 OFFICE O/P EST MOD 30 MIN: CPT | Performed by: STUDENT IN AN ORGANIZED HEALTH CARE EDUCATION/TRAINING PROGRAM

## 2024-02-09 PROCEDURE — 1126F AMNT PAIN NOTED NONE PRSNT: CPT | Performed by: STUDENT IN AN ORGANIZED HEALTH CARE EDUCATION/TRAINING PROGRAM

## 2024-02-09 RX ORDER — TRIAMCINOLONE ACETONIDE 1 MG/G
OINTMENT TOPICAL DAILY
Qty: 80 G | Refills: 1 | Status: SHIPPED | OUTPATIENT
Start: 2024-02-09

## 2024-02-09 RX ORDER — FLUOCINONIDE 0.5 MG/G
OINTMENT TOPICAL
Qty: 60 G | Refills: 1 | Status: SHIPPED | OUTPATIENT
Start: 2024-02-09

## 2024-02-09 NOTE — PROGRESS NOTES
Subjective     Sergio Newton is a 79 y.o. male who presents for the following: Skin Check (Pt concerned about spot on left calf, back, and bilat hands.).     Review of Systems:  No other skin or systemic complaints other than what is documented elsewhere in the note.    The following portions of the chart were reviewed this encounter and updated as appropriate:          Skin Cancer History  No skin cancer on file.      Specialty Problems          Dermatology Problems    Hemangioma of skin and subcutaneous tissue    Melanocytic nevi of other parts of face    Melanocytic nevi of trunk    Neoplasm of uncertain behavior of skin    Other hypertrophic disorders of the skin    Other melanin hyperpigmentation    Other seborrheic keratosis    Benign lipomatous neoplasm of skin and subcutaneous tissue of left arm        Objective   Well appearing patient in no apparent distress; mood and affect are within normal limits.    A full skin examination was performed. All findings within normal limits unless otherwise noted below.    Assessment/Plan   1. Multiple benign nevi  Exam findings: Benign appearing macules and papules  Plan: monitor for any new or changing nevi. Notify me should this occur.  Over the counter use of sun screen product (30+ SPF with mineral sun screen) recommended    2. Dermatitis  Left Lower Leg - Posterior  Chronic  Hands/legs  Suboptimally controlled with intermittent flaring  Flaring today  Rx management    Eczematous patch on left lower leg  Scaling fissured patches on hands    Start Fluocinonide ointment every day prn  Also with likely irritant dermatitis on the hands  Dry skin care reviewed for hands  Can use fluocinonide ointment q few days prn flaring if needed

## 2024-02-09 NOTE — PATIENT INSTRUCTIONS
Dove bar soap for hand washing  Use rubber gloves when washing dishes  Use my medication for your leg if needed  Can apply a thin layer of that medication on your hands too if there are days when they are very itchy

## 2024-03-25 ENCOUNTER — LAB (OUTPATIENT)
Dept: LAB | Facility: CLINIC | Age: 79
End: 2024-03-25
Payer: COMMERCIAL

## 2024-03-25 ENCOUNTER — HOSPITAL ENCOUNTER (OUTPATIENT)
Dept: RADIOLOGY | Facility: CLINIC | Age: 79
Discharge: HOME | End: 2024-03-25
Payer: COMMERCIAL

## 2024-03-25 DIAGNOSIS — N28.1 CYST OF KIDNEY, ACQUIRED: ICD-10-CM

## 2024-03-25 DIAGNOSIS — C61 MALIGNANT NEOPLASM OF PROSTATE (MULTI): Primary | ICD-10-CM

## 2024-03-25 LAB — PSA SERPL-MCNC: <0.1 NG/ML

## 2024-03-25 PROCEDURE — 36415 COLL VENOUS BLD VENIPUNCTURE: CPT

## 2024-03-25 PROCEDURE — 76770 US EXAM ABDO BACK WALL COMP: CPT

## 2024-03-25 PROCEDURE — 76770 US EXAM ABDO BACK WALL COMP: CPT | Performed by: RADIOLOGY

## 2024-03-25 PROCEDURE — 84153 ASSAY OF PSA TOTAL: CPT

## 2024-03-28 NOTE — PROGRESS NOTES
[facility]  Outpatient Clinic Note    Subjective   Sergio Newton is a 79 y.o. male Annual FUV     History of Present Illness   79 year old male presents for Annual FUV. Previously followed Lynn Kirby CNP. History of Prostate cancer, Justice 5+4=9, s/p RALP April 2010, Erectile dysfunction, has trialed Viagara, cialis, MAHAD, and Trimix with no significant response. Has some spontaneous erections, not sufficient for penetration, and Complex   Considered IPP, no longer interested.     Renal cyst stable. Mild ROBB, NTF 1 no gross hematuria, dysuria, urgency, or straining. Mild ROBB, NTF 1 no gross hematuria, dysuria, urgency, or straining.     Kidney Ultrasound from 03/22/2023 which demonstrates Left renal cyst stable since last imaging     I personally reviewed Renal Ultrasound from 03/26/2024: Stable mildly complex Bosniak 2 left renal cyst compared to 03/22/2023.  Status post prostatectomy. Patient reassured.     JANE Score: 1   International Prostate Symptom Score (I-PSS)   I-PSS Total Score: 4   Quality of Life Score: 2     Urinalysis today Negative   PVR 3  ml     Lab Results   Component Value Date    PSA <0.10 03/25/2024    PSA <0.10 09/19/2023    PSA <0.10 08/02/2023    PSA <0.10 03/22/2023    PSA <0.10 09/06/2022    PSA <0.10 03/28/2022    PSA <0.10 03/29/2021    PSA <0.10 08/21/2020    PSA <0.10 02/19/2020    PSA <0.10 08/07/2019     Past Medical History and Surgical History   Past Medical History:   Diagnosis Date    Encounter for screening for malignant neoplasm of prostate 02/27/2018    Encounter for prostate cancer screening    Familial hypercholesterolemia 07/30/2021    Essential familial hypercholesterolemia    Obstructive sleep apnea (adult) (pediatric) 07/29/2020    Obstructive sleep apnea    Personal history of colonic polyps 03/05/2019    History of colonic polyps    Personal history of other diseases of the respiratory system 08/01/2022    History of allergic rhinitis     Past Surgical  History:   Procedure Laterality Date    FOOT SURGERY  06/21/2013    Foot Surgery    TONSILLECTOMY  06/21/2013    Tonsillectomy       Medications  Current Outpatient Medications on File Prior to Visit   Medication Sig Dispense Refill    amoxicillin-pot clavulanate (Augmentin) 875-125 mg tablet Take 1 tablet by mouth 2 times a day after meals.      aspirin (Ecotrin Low Strength) 81 mg EC tablet Take 1 tablet (81 mg) by mouth once daily.      azelastine (Astelin) 137 mcg (0.1 %) nasal spray 2 sprays by Does not apply route 2 times a day as needed.      azelastine (Optivar) 0.05 % ophthalmic solution Administer 1 drop into both eyes 2 times a day as needed.      b complex 0.4 mg tablet Take 1 tablet by mouth once daily.      cholecalciferol (Vitamin D-3) 50 MCG (2000 UT) tablet Take 1 tablet (50 mcg) by mouth once daily.      econazole nitrate 1 % cream Apply 1 Application topically twice a day.      fexofenadine (Allegra) 180 mg tablet Take 1 tablet (180 mg) by mouth once daily.      flunisolide (Nasalide) 25 mcg (0.025 %) spray,non-aerosol Administer 2 sprays into each nostril once daily.      fluocinonide (Lidex) 0.05 % ointment Use on red itchy area daily for 2 weeks 60 g 1    fluticasone (Flonase Allergy Relief) 50 mcg/actuation nasal spray Administer 2 sprays into each nostril once daily.      ketotifen (Zaditor) 0.025 % (0.035 %) ophthalmic solution Administer 1 drop into both eyes twice a day.      melatonin 3 mg capsule Take 1 capsule by mouth as needed at bedtime.      melatonin tablet Take 1 tablet (1 mg) by mouth once daily at bedtime.      montelukast (Singulair) 10 mg tablet Take 1 tablet (10 mg) by mouth once daily.      multivitamin tablet Take 1 tablet by mouth once daily.      peg 400-propylene glycol (SYSTANE) 0.4-0.3 % drops ophthalmic drops Administer 1 drop into both eyes twice a day.      prazosin (Minipress) 1 mg capsule Take 1 capsule (1 mg) by mouth once daily.      psyllium husk, aspartame,  (Metamucil Sugar-Free, aspart,) 3.4 gram/5.8 gram powder Take by mouth.      psyllium seed (PSYLLIUM ORAL) Take by mouth.      semaglutide (Ozempic) 0.25 mg or 0.5 mg(2 mg/1.5 mL) pen injector Inject 0.25 mg under the skin 1 (one) time per week. (Patient not taking: Reported on 2/9/2024) 1.5 mL 3    simvastatin (Zocor) 40 mg tablet Take 0.5 tablets (20 mg) by mouth once daily.      simvastatin (Zocor) 80 mg tablet Take 0.5 tablets (40 mg) by mouth once daily.      timolol (Timoptic) 0.5 % ophthalmic solution Administer into affected eye(s) every 12 hours.      triamcinolone (Kenalog) 0.1 % ointment Apply topically once daily. If needed for itch 80 g 1    vitamins A,C,E-zinc-copper (PreserVision AREDS) 2,148 mcg-113 mg-45 mg-17.4mg tablet Take 1 tablet by mouth every 12 hours.       No current facility-administered medications on file prior to visit.       Objective   Physicial Exam  General: Well developed, well nourished, alert and cooperative, appears in no acute distress  Eyes: Non-injected conjunctiva, sclera clear, no proptosis  Cardiac: Extremities are warm and well perfused. No edema, cyanosis or pallor.   Lungs: Breathing is easy, non-labored. Speaking in clear and complete sentences. Normal diaphragmatic movement.  MSK: Ambulatory with steady gait, unassisted  Neuro: alert and oriented to person, place and time  Psych: Demonstrates good judgement and reason, without hallucinations, abnormal affect or abnormal behaviors.  Skin: no obvious lesions, no rashes.    Review of Systems  All other systems have been reviewed and are negative for complaint.      Assessment and Plan   89 year old male presents for Annual FUV. Previously followed Lynn Kirby CNP. History of Prostate cancer, Carter 5+4=9, s/p RALP April 2010, Erectile dysfunction, has trialed Viagara, cialis, MAHAD, and Trimix with no significant response. Has some spontaneous erections, not sufficient for penetration, and Complex   Considered IPP, no  longer interested.     Renal cyst stable. Mild ROBB, NTF 1 no gross hematuria, dysuria, urgency, or straining. Mild ROBB, NTF 1 no gross hematuria, dysuria, urgency, or straining.     Kidney Ultrasound from 03/22/2023 which demonstrates Left renal cyst stable since last imaging     I personally reviewed Renal Ultrasound from 03/26/2024: Stable mildly complex Bosniak 2 left renal cyst compared to 03/22/2023.  Status post prostatectomy. Patient reassured.     JANE Score: 1   International Prostate Symptom Score (I-PSS)   I-PSS Total Score: 4   Quality of Life Score: 2     Reviewed Bosniak 2 surveillance recommendations; Imaging every 6-12 months for up to 5 years after the initial diagnosis. Patient elects to monitor annually     Patient will return to clinic in one year with PSA lab draw prior and renal ultrasound     All questions and concerns were addressed. Patient verbalizes understanding and has no other questions at this time. You are able to have email access to your chart. You can sign into [employer] or add the Follow [facility] delphine on your smart phone to review today's visit, laboratory work and imaging.   If you have any questions about your care, do not hesitate to call and leave a message, we return calls in a timely manner.    Miracle Vaz-- KAJAL CASTILLO  Office Phone:  [phone]

## 2024-03-29 ENCOUNTER — APPOINTMENT (OUTPATIENT)
Dept: UROLOGY | Facility: HOSPITAL | Age: 79
End: 2024-03-29
Payer: MEDICARE

## 2024-03-29 ENCOUNTER — OFFICE VISIT (OUTPATIENT)
Dept: UROLOGY | Facility: HOSPITAL | Age: 79
End: 2024-03-29
Payer: COMMERCIAL

## 2024-03-29 DIAGNOSIS — N52.9 MALE ERECTILE DISORDER OF ORGANIC ORIGIN: ICD-10-CM

## 2024-03-29 DIAGNOSIS — N28.1 RENAL CYST: ICD-10-CM

## 2024-03-29 DIAGNOSIS — C61 ADENOCARCINOMA OF PROSTATE (MULTI): Primary | ICD-10-CM

## 2024-03-29 LAB
POC APPEARANCE, URINE: CLEAR
POC BILIRUBIN, URINE: NEGATIVE
POC BLOOD, URINE: NEGATIVE
POC COLOR, URINE: YELLOW
POC GLUCOSE, URINE: NEGATIVE MG/DL
POC KETONES, URINE: NEGATIVE MG/DL
POC LEUKOCYTES, URINE: NEGATIVE
POC NITRITE,URINE: NEGATIVE
POC PH, URINE: 7 PH
POC PROTEIN, URINE: NORMAL MG/DL
POC SPECIFIC GRAVITY, URINE: >=1.03
POC UROBILINOGEN, URINE: 1 EU/DL

## 2024-03-29 PROCEDURE — 81002 URINALYSIS NONAUTO W/O SCOPE: CPT | Performed by: NURSE PRACTITIONER

## 2024-03-29 PROCEDURE — 51798 US URINE CAPACITY MEASURE: CPT | Performed by: NURSE PRACTITIONER

## 2024-03-29 PROCEDURE — 99213 OFFICE O/P EST LOW 20 MIN: CPT | Performed by: NURSE PRACTITIONER

## 2024-03-29 PROCEDURE — 1159F MED LIST DOCD IN RCRD: CPT | Performed by: NURSE PRACTITIONER

## 2024-07-31 NOTE — PROGRESS NOTES
Subjective   Reason for Visit: Sergio Newton is an 79 y.o. male here for a Medicare Wellness visit.               HPI  The patient reports that since falling 1 month ago he has experienced intermittent episodes of pain in the left elbow region.  He reports that the episodes of pain have been precipitated by palpation and when pressure has been applied to the elbow.  He reports associated constant swelling in the region.  He reports no other associated symptoms.    He reports a predominantly nonproductive cough times a few weeks.  He still reports continued chronic intermittent bloating episodes of rhinorrhea times years-unchanged over the past few weeks.  He reports no other associated symptoms.    He also reports the intermittent presence of a pruritic rash over the ventral surface of the left forearm x 3-4 weeks.  He reports no associated symptoms.    He reports the presence of a mass located on the ventral surface midportion of the right forearm times years.  He reports that up until 3 to 4 months ago he could push on the area and make it disappear but he has not been able to make the area disappear x 3 to 4 months.  He reports no associated pain, preceding trauma/overexertion.  No other associated symptoms.    The patient reports a history of intermittent episodes of cramping pain in either calf region x 1 to 2 months.  He reports that the episodes occur in bed and resolved when he gets up to walk.  He reports no other associated symptoms.    The patient over the past year reports continued chronic intermittent episodes of burning pain in the left hip region.  He still reports that the episodes sometimes can be precipitated by exercise.  He reports no radiation of discomfort.  He reports no other associated symptoms.  Over the past year, he reports a decrease in the frequency and intensity of the episodes.    No other new complaints    No other new complaints.  Patient Care Team:  Luis Rose MD as PCP -  General (Internal Medicine)  Luis Rose MD as PCP - [facility] [employer] PCP     Review of Systems  All systems have been reviewed and are normal except as previously noted  Objective   Vitals:  There were no vitals taken for this visit.      Physical Exam  Head-palpation revealed no tenderness over the maxillary or frontal sinuses  Eyes-extraocular movements intact ;pupils equal and reactive to light; fundi not well visualized.   Ears-palpation of pinnas and tragus revealed no tenderness. External auditory canals not erythematous or swollen.  Right TM not visualized secondary to impacted cerumen..  Left TM clear  Nose-turbinates not erythematous or swollen; no septal deviation noted  Mouth-posterior pharynx is not erythematous or swollen. Tonsillar pillars appeared normal no exudates  Neck no lymphadenopathy. Thyroid gland not enlarged. , No bruits  Breasts-bilateral gynecomastia noted.  Lungs-clear to auscultation bilaterally  Cardiac-rate normal,, rhythm regular no murmurs no JVD  Abdomen-soft ,obese; normal active bowel sounds. There is a 2 x 2 centimeter mass located in the umbilicus. There is a 10 x 10 cm mass located in the epigastrium. Palpation of both regions revealed no tenderness. No tenderness or masses noted throughout the rest of the abdomen. Liver percussed to 9 cm in total span  Pulses 2+ bilaterally  Extremities-no peripheral edema  Musculoskeletal  Cervical spine-no erythema or swelling. Decreased range of motion with no pain on extension 30 degrees.;  Full range of motion with no pain noted in all other directions of motion.. Palpation revealed no tenderness or increase in warmth.  Left elbow-no erythema noted.  There is a 4 x 4 centimeter area of swelling located over the elbow.  Full range of motion in all directions of motion with no pain.  Palpation of this region however revealed tenderness but no increase in warmth  CMC joint right thumb-  no erythema or swelling, Full  range of motion in all directions of motion with no pain. Palpation revealed no tenderness or increase in warmth.  CMC joint left thumb-no erythema or swelling, Full range of motion with pain noted on opposition.  Full range of motion with no pain noted in all other directions of motion.  Palpation revealed no tenderness or increase in warmth  Left hip-no erythema or swelling. Windshield wiper test negative. Full range of motion in all directions of motion with no pain. Palpation revealed no tenderness or increase in warmth     Skin-varicose veins noted in the lower extremities bilaterally.  There is a 6 x 6 cm mass located over the lateral surface of the left shoulder.  Palpation revealed no tenderness or increase in warmth.    There is a 2 x 2 cm nodule located midportion ventral surface of the right forearm.  Palpation revealed the nodule to be nonmovable, nontender, no increase in warmth.  Erythematous papular eruption located at the proximal end of the ventral surface of the left forearm.  No necrotic tissue noted.  No drainage noted.  Palpation revealed no tenderness or increase in warmth  Neurologic  Mental status-alert and oriented x3   Cranial nerves-2 through 12 grossly intact, no visual field abnormalities  Motor-no pronator drift noted, strength-5/5 in all muscle groups tested, , no tremor noted.  No bradykinesia noted.  No rigidity noted.  Negative pull test  Sensory-Light touch sensation fully intact  Pinprick sensation fully intact  Vibratory sensation fully intact.  Cerebellar-no truncal ataxia, good coordination finger-nose testing,, good coordination heel-to-shin testing, normal rapid alternating movements  Slightly positive Romberg, mildly decreased coordination in tandem gait,   Reflexes-1+/4 bilaterally  Assessment/Plan   Elevated coronary calcium score  Allergic rhinitis  Gallstones  Umbilical hernia  Ventral hernia  Colonic polyps  Prostate cancer  Vitamin D  deficiency  Hypercholesterolemia       Assessment.  Elevated coronary calcium score.  COVID-19 June 2022  Recent history of nonproductive cough-May be secondary to viral syndrome, bronchitis, sinusitis, allergic rhinitis  Chronic intermittent episodes of rhinorrhea,-likely secondary to allergic rhinitis again probably secondary to dust exposure because of the construction that has been occurring in the patient's home.  Allergic rhinitis  Gallstones  Chronic presence of mass in the umbilical region in the epigastrium likely represent hernias  Umbilical hernia  Ventral hernia  Diverticulitis February 2003,? November 2016  Colonic polyps  Chronic intermittent episodes of urinary incontinence only occurring when standing up quickly or lifting-likely secondary to stress incontinence secondary to the patient's prostatectomy  Erectile dysfunction manifested as chronic inability to achieve erections during and in the absence of intercourse- likely secondary to patient's radical prostatectomy  Prostate cancer status post radical prostatectomy April 2010  Hemorrhagic cyst left kidney  Rotator cuff disease right shoulder  Intermittent episodes of sharp pain noted in the left elbow region-May be secondary to olecranon bursitis left elbow related to the patient's recent trauma  Chronic intermittent episodes of sharp or aching pain noted at the CMC joints of both thumbs-may be secondary to osteoarthritis.  Chronic intermittent episodes of burning pain in the left hip region sometime precipitated by exercise-unsure of etiology. May be secondary to left hip greater trochanteric bursitis,  Left hip greater trochanteric bursitis diagnosed June 2022  Osteoarthritis of the right knee.  Intermittent episodes of cramping pain in either calf region-may represent idiopathic nocturnal leg cramps  Plantar fasciitis right heel; right heel bursitis December 1, 2023  Vitamin D deficiency  Hypercholesterolemia.  Intermittent presence of a  pruritic rash over the ventral surface of the left forearm-May be secondary to atopic dermatitis  Presence of a mass located over the ventral surface midportion of the right forearm-may represent a sebaceous cyst?  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  Lipoma left shoulder   Bilateral cataracts status post removal June 2017  Bilateral episcleritis  Glaucoma  Bilateral macular degeneration  Bilateral Fuchs dystrophy  Anterior basement membrane dystrophy of both eyes  Superficial punctate keratitis-bilateral July 26, 2024  Small vessel ischemic changes  Chronic intermittent episodes of aching pain and stiffness noted over the posterior surface of the neck-may be secondary to osteoarthritis and degenerative disc disease of the cervical spine, cervical spinal stenosis  Chronic intermittent episodes of, grinding and popping sounds in the neck likely secondary to cervical spinal stenosis, osteoarthritis and degenerative disc disease of the cervical spine  Central canal stenosis C4-C5, C6-C7  Bilateral neuroforaminal stenosis C4-C5, C5-C6, C6-C7  Chronic fatigue-may be secondary to inadequate settings on CPAP device.. Depression may be a contributing factor..   Obstructive sleep apnea  Chronic insomnia manifested as frequent awakenings-probably secondary to depression, PTSD  PTSD  Depression     Plan  Obtain EKG , urinalysis CBC differential CMP fasting lipid profile vitamin D level, vitamin B12 level, PSA, TSH, as soon as possible.  Obtain chest x-ray as soon as possible.  I did recommend that the patient restart physical therapy exercises.  After a long discussion, the patient and I have elected to hold off on a consultation with a neurosurgeon or orthopedic spine surgeon.  Refer back to dermatologist for further evaluation and treatment  I told the patient that he could apply ice to the left elbow 10 to 15 minutes at a time 2-3 times  per day as needed  Begin Robitussin DM 10 mL p.o. every 6 hours as needed cough.  Increase melatonin dosage from 3 mg nightly to 6 mg p.o. nightly  I will prescribe triamcinolone 0.1% cream to be applied twice daily for no more than 14 consecutive days to the rash left forearm   I again urged the patient to take Aleve 440 mg p.o. twice daily as needed, Tylenol 1000 mg p.o. every 6 hours as needed pain, or Advil 400 mg p.o. every 4 to 6 hours as needed pain.,and to apply Voltaren gel to painful regions 4 times per day as needed.       Patient should call me in 2 weeks with his condition regarding the cough, rash.

## 2024-08-05 ENCOUNTER — APPOINTMENT (OUTPATIENT)
Dept: PRIMARY CARE | Facility: CLINIC | Age: 79
End: 2024-08-05
Payer: COMMERCIAL

## 2024-08-05 ENCOUNTER — HOSPITAL ENCOUNTER (OUTPATIENT)
Dept: RADIOLOGY | Facility: CLINIC | Age: 79
Discharge: HOME | End: 2024-08-05
Payer: COMMERCIAL

## 2024-08-05 VITALS
WEIGHT: 276 LBS | TEMPERATURE: 97.2 F | HEART RATE: 66 BPM | DIASTOLIC BLOOD PRESSURE: 80 MMHG | SYSTOLIC BLOOD PRESSURE: 106 MMHG | BODY MASS INDEX: 48.89 KG/M2

## 2024-08-05 DIAGNOSIS — Z00.00 ROUTINE GENERAL MEDICAL EXAMINATION AT A HEALTH CARE FACILITY: Primary | ICD-10-CM

## 2024-08-05 DIAGNOSIS — E78.2 HYPERLIPEMIA, MIXED: ICD-10-CM

## 2024-08-05 DIAGNOSIS — R05.2 SUBACUTE COUGH: ICD-10-CM

## 2024-08-05 DIAGNOSIS — C61 ADENOCARCINOMA OF PROSTATE (MULTI): ICD-10-CM

## 2024-08-05 DIAGNOSIS — L21.9 SEBORRHEIC DERMATITIS: ICD-10-CM

## 2024-08-05 DIAGNOSIS — R93.1 ELEVATED CORONARY ARTERY CALCIUM SCORE: ICD-10-CM

## 2024-08-05 DIAGNOSIS — E66.01 MORBID OBESITY (MULTI): ICD-10-CM

## 2024-08-05 DIAGNOSIS — G47.62 NOCTURNAL LEG CRAMPS: ICD-10-CM

## 2024-08-05 DIAGNOSIS — E88.810 DYSMETABOLIC SYNDROME: ICD-10-CM

## 2024-08-05 DIAGNOSIS — H35.3120 NONEXUDATIVE AGE-RELATED MACULAR DEGENERATION OF LEFT EYE, UNSPECIFIED STAGE: ICD-10-CM

## 2024-08-05 DIAGNOSIS — F43.10 PTSD (POST-TRAUMATIC STRESS DISORDER): ICD-10-CM

## 2024-08-05 LAB
POC APPEARANCE, URINE: CLEAR
POC BILIRUBIN, URINE: NEGATIVE
POC BLOOD, URINE: NEGATIVE
POC COLOR, URINE: YELLOW
POC GLUCOSE, URINE: NEGATIVE MG/DL
POC KETONES, URINE: NEGATIVE MG/DL
POC LEUKOCYTES, URINE: NEGATIVE
POC NITRITE,URINE: NEGATIVE
POC PH, URINE: 5.5 PH
POC PROTEIN, URINE: NEGATIVE MG/DL
POC SPECIFIC GRAVITY, URINE: 1.01
POC UROBILINOGEN, URINE: 0.2 EU/DL

## 2024-08-05 PROCEDURE — 1170F FXNL STATUS ASSESSED: CPT | Performed by: INTERNAL MEDICINE

## 2024-08-05 PROCEDURE — 1160F RVW MEDS BY RX/DR IN RCRD: CPT | Performed by: INTERNAL MEDICINE

## 2024-08-05 PROCEDURE — 71046 X-RAY EXAM CHEST 2 VIEWS: CPT | Performed by: RADIOLOGY

## 2024-08-05 PROCEDURE — 71046 X-RAY EXAM CHEST 2 VIEWS: CPT

## 2024-08-05 PROCEDURE — 81002 URINALYSIS NONAUTO W/O SCOPE: CPT | Performed by: INTERNAL MEDICINE

## 2024-08-05 PROCEDURE — 1159F MED LIST DOCD IN RCRD: CPT | Performed by: INTERNAL MEDICINE

## 2024-08-05 PROCEDURE — G0439 PPPS, SUBSEQ VISIT: HCPCS | Performed by: INTERNAL MEDICINE

## 2024-08-05 PROCEDURE — 99214 OFFICE O/P EST MOD 30 MIN: CPT | Performed by: INTERNAL MEDICINE

## 2024-08-05 PROCEDURE — 93000 ELECTROCARDIOGRAM COMPLETE: CPT | Performed by: INTERNAL MEDICINE

## 2024-08-05 RX ORDER — TRIAMCINOLONE ACETONIDE 1 MG/G
OINTMENT TOPICAL 2 TIMES DAILY
Qty: 80 G | Refills: 1 | Status: SHIPPED | OUTPATIENT
Start: 2024-08-05

## 2024-08-05 ASSESSMENT — ACTIVITIES OF DAILY LIVING (ADL)
BATHING: INDEPENDENT
GROCERY_SHOPPING: INDEPENDENT
DOING_HOUSEWORK: INDEPENDENT
MANAGING_FINANCES: INDEPENDENT
DRESSING: INDEPENDENT
TAKING_MEDICATION: INDEPENDENT

## 2024-08-05 ASSESSMENT — ENCOUNTER SYMPTOMS
LOSS OF SENSATION IN FEET: 0
OCCASIONAL FEELINGS OF UNSTEADINESS: 1
DEPRESSION: 0

## 2024-08-06 ENCOUNTER — LAB (OUTPATIENT)
Dept: LAB | Facility: LAB | Age: 79
End: 2024-08-06
Payer: COMMERCIAL

## 2024-08-06 DIAGNOSIS — E88.810 DYSMETABOLIC SYNDROME: ICD-10-CM

## 2024-08-06 DIAGNOSIS — R05.2 SUBACUTE COUGH: ICD-10-CM

## 2024-08-06 DIAGNOSIS — E55.9 VITAMIN D DEFICIENCY: ICD-10-CM

## 2024-08-06 DIAGNOSIS — G47.62 NOCTURNAL LEG CRAMPS: ICD-10-CM

## 2024-08-06 DIAGNOSIS — R53.82 CHRONIC FATIGUE: ICD-10-CM

## 2024-08-06 LAB — CK SERPL-CCNC: 307 U/L (ref 0–325)

## 2024-08-06 PROCEDURE — 84443 ASSAY THYROID STIM HORMONE: CPT

## 2024-08-06 PROCEDURE — 86790 VIRUS ANTIBODY NOS: CPT

## 2024-08-06 PROCEDURE — 82306 VITAMIN D 25 HYDROXY: CPT

## 2024-08-06 PROCEDURE — 80053 COMPREHEN METABOLIC PANEL: CPT

## 2024-08-06 PROCEDURE — 36415 COLL VENOUS BLD VENIPUNCTURE: CPT

## 2024-08-06 PROCEDURE — 82550 ASSAY OF CK (CPK): CPT

## 2024-08-06 PROCEDURE — 82607 VITAMIN B-12: CPT

## 2024-08-07 DIAGNOSIS — R53.82 CHRONIC FATIGUE: ICD-10-CM

## 2024-08-07 DIAGNOSIS — E55.9 VITAMIN D DEFICIENCY: ICD-10-CM

## 2024-08-07 DIAGNOSIS — E88.810 DYSMETABOLIC SYNDROME: Primary | ICD-10-CM

## 2024-08-07 LAB
25(OH)D3 SERPL-MCNC: 30 NG/ML (ref 30–100)
ALBUMIN SERPL BCP-MCNC: 3.9 G/DL (ref 3.4–5)
ALP SERPL-CCNC: 67 U/L (ref 33–136)
ALT SERPL W P-5'-P-CCNC: 20 U/L (ref 10–52)
ANION GAP SERPL CALC-SCNC: 18 MMOL/L (ref 10–20)
AST SERPL W P-5'-P-CCNC: 20 U/L (ref 9–39)
BILIRUB SERPL-MCNC: 0.6 MG/DL (ref 0–1.2)
BUN SERPL-MCNC: 21 MG/DL (ref 6–23)
CALCIUM SERPL-MCNC: 8.5 MG/DL (ref 8.6–10.6)
CHLORIDE SERPL-SCNC: 107 MMOL/L (ref 98–107)
CO2 SERPL-SCNC: 23 MMOL/L (ref 21–32)
CREAT SERPL-MCNC: 1.05 MG/DL (ref 0.5–1.3)
EGFRCR SERPLBLD CKD-EPI 2021: 72 ML/MIN/1.73M*2
GLUCOSE SERPL-MCNC: 85 MG/DL (ref 74–99)
POTASSIUM SERPL-SCNC: 4.3 MMOL/L (ref 3.5–5.3)
PROT SERPL-MCNC: 6.8 G/DL (ref 6.4–8.2)
SARS-COV-2 IGG SERPLBLD QL IA.RAPID: NEGATIVE
SODIUM SERPL-SCNC: 144 MMOL/L (ref 136–145)
TSH SERPL-ACNC: 2.91 MIU/L (ref 0.44–3.98)
VIT B12 SERPL-MCNC: 950 PG/ML (ref 211–911)

## 2024-10-09 ENCOUNTER — LAB (OUTPATIENT)
Dept: LAB | Facility: CLINIC | Age: 79
End: 2024-10-09
Payer: COMMERCIAL

## 2024-10-09 DIAGNOSIS — C61 ADENOCARCINOMA OF PROSTATE (MULTI): ICD-10-CM

## 2024-10-09 LAB — PSA SERPL-MCNC: <0.1 NG/ML

## 2024-10-09 PROCEDURE — 36415 COLL VENOUS BLD VENIPUNCTURE: CPT

## 2024-10-09 PROCEDURE — 84153 ASSAY OF PSA TOTAL: CPT

## 2024-11-27 ENCOUNTER — HOSPITAL ENCOUNTER (OUTPATIENT)
Dept: RADIOLOGY | Facility: CLINIC | Age: 79
Discharge: HOME | End: 2024-11-27
Payer: COMMERCIAL

## 2024-11-27 ENCOUNTER — OFFICE VISIT (OUTPATIENT)
Dept: PRIMARY CARE | Facility: CLINIC | Age: 79
End: 2024-11-27
Payer: COMMERCIAL

## 2024-11-27 ENCOUNTER — TELEPHONE (OUTPATIENT)
Dept: PRIMARY CARE | Facility: CLINIC | Age: 79
End: 2024-11-27

## 2024-11-27 VITALS
BODY MASS INDEX: 39.55 KG/M2 | HEIGHT: 63 IN | SYSTOLIC BLOOD PRESSURE: 120 MMHG | TEMPERATURE: 98.5 F | WEIGHT: 223.2 LBS | HEART RATE: 96 BPM | DIASTOLIC BLOOD PRESSURE: 86 MMHG

## 2024-11-27 DIAGNOSIS — J40 BRONCHITIS: Primary | ICD-10-CM

## 2024-11-27 DIAGNOSIS — J40 BRONCHITIS: ICD-10-CM

## 2024-11-27 DIAGNOSIS — J01.00 ACUTE NON-RECURRENT MAXILLARY SINUSITIS: ICD-10-CM

## 2024-11-27 PROCEDURE — 71046 X-RAY EXAM CHEST 2 VIEWS: CPT | Performed by: RADIOLOGY

## 2024-11-27 PROCEDURE — 71046 X-RAY EXAM CHEST 2 VIEWS: CPT

## 2024-11-27 PROCEDURE — 99212 OFFICE O/P EST SF 10 MIN: CPT | Performed by: INTERNAL MEDICINE

## 2024-11-27 RX ORDER — AMOXICILLIN AND CLAVULANATE POTASSIUM 875; 125 MG/1; MG/1
875 TABLET, FILM COATED ORAL 2 TIMES DAILY
Qty: 20 TABLET | Refills: 0 | Status: SHIPPED | OUTPATIENT
Start: 2024-11-27 | End: 2024-12-07

## 2024-11-27 RX ORDER — BENZONATATE 200 MG/1
200 CAPSULE ORAL 3 TIMES DAILY PRN
Qty: 30 CAPSULE | Refills: 1 | Status: SHIPPED | OUTPATIENT
Start: 2024-11-27 | End: 2024-12-27

## 2024-11-27 NOTE — TELEPHONE ENCOUNTER
Chest x-ray revealed no acute cardiopulmonary process.  If the patient's COVID-19 test is negative, I will start him on Augmentin 875 mg p.o. twice daily x 10 days.  I have added Tessalon pearls 200 mg p.o. 3 times daily as needed cough.

## 2024-11-27 NOTE — PROGRESS NOTES
Subjective   Patient ID: Sergio Newton is a 79 y.o. male who presents for follow-up visit    HPI   The patient reports a history of cough x 2 days.  He reports that the cough was nonproductive yesterday but has been productive of yellow sputum today.  He also reports a history of rhinorrhea, yellow nasal discharge, constant plugged ears, sore throat, hoarseness x 2 days.  He does report a history of an episode of constant sharp stabbing pain in the lower central chest region beginning last night which resolved this morning after he coughed up a glob of yellow sputum.  The patient reports no other associated symptoms.  He has not yet performed a rapid COVID test  Review of Systems    Objective   There were no vitals taken for this visit.    Physical Exam  Head-palpation revealed no tenderness over the maxillary or frontal sinuses  Ears-palpation of pinnas and tragus revealed no tenderness. External auditory canals not erythematous or swollen.  Right TM not visualized secondary to impacted cerumen..  Left TM not visualized secondary to impacted cerumen  Nose-turbinates not erythematous or swollen; no septal deviation noted  Mouth-posterior pharynx is not erythematous or swollen. Tonsillar pillars appeared normal no exudates  Neck no lymphadenopathy. Thyroid gland not enlarged. , No bruits  Lungs-clear to auscultation bilaterally  Cardiac-rate normal,, rhythm regular no murmurs no JVD  Abdomen-soft ,obese; normal active bowel sounds. There is a 2 x 2 centimeter mass located in the umbilicus. There is a 10 x 10 cm mass located in the epigastrium. Palpation of both regions revealed no tenderness. No tenderness or masses noted throughout the rest of the abdomen. Liver percussed to 9 cm in total span  Extremities-no peripheral edema  Musculoskeletal   Chest-no erythema or swelling, Full range of motion in all directions of motion with no pain. Palpation revealed no tenderness or increase in warmth  Assessment/Plan    Problem List Items Addressed This Visit             ICD-10-CM    Acute non-recurrent maxillary sinusitis J01.00     Other Visit Diagnoses         Codes    Bronchitis    -  Primary J40    Relevant Orders    XR chest 2 views (Completed)             Assessment  History of an episode of constant sharp stabbing pain in the lower central chest region-unsure of etiology.  May be secondary to viral syndrome, bronchitis, community-acquired pneumonia, COVID-19  Cough, rhinorrhea, yellow nasal discharge, hoarseness, sore throat-May be secondary to viral syndrome, sinusitis, COVID-19  Constant plugged ears-May be secondary to bilateral eustachian tube dysfunction secondary to viral syndrome, sinusitis, COVID-19  Plan  Obtain chest x-ray stat.  I have recommended that the patient perform a rapid COVID test to soon as possible.  I will decide on further therapy after receiving the results of the aforementioned tests

## 2024-12-13 ENCOUNTER — TELEMEDICINE (OUTPATIENT)
Dept: PRIMARY CARE | Facility: CLINIC | Age: 79
End: 2024-12-13
Payer: COMMERCIAL

## 2024-12-13 DIAGNOSIS — K57.92 ACUTE DIVERTICULITIS: Primary | ICD-10-CM

## 2024-12-13 DIAGNOSIS — K57.92 DIVERTICULITIS: Primary | ICD-10-CM

## 2024-12-13 PROCEDURE — 99441 PR PHYS/QHP TELEPHONE EVALUATION 5-10 MIN: CPT | Performed by: INTERNAL MEDICINE

## 2024-12-13 RX ORDER — AMOXICILLIN AND CLAVULANATE POTASSIUM 875; 125 MG/1; MG/1
875 TABLET, FILM COATED ORAL 2 TIMES DAILY
Qty: 20 TABLET | Refills: 0 | Status: SHIPPED | OUTPATIENT
Start: 2024-12-13 | End: 2024-12-23

## 2024-12-13 NOTE — PROGRESS NOTES
Subjective   Patient ID: Sergio Newton is a 79 y.o. male who presents for diverticulitis.    HPI   The patient reports a history of constant mild pain-unable to describe-of the left lower quadrant x 3 days.  He reports an increase in the intensity of the pain when he lifts his legs to lie in bed.  He reports a decrease in the intensity of the pain with passage of flatus, defecation.  He reports that he did have 1 liquid bowel movement Tuesday evening and 1 liquid bowel movement Wednesday morning prior to the onset of the aforementioned discomfort.  He reports no fever, chills, nausea, vomiting, melena, hematochezia, change in urinary habits.  Review of Systems    Objective   There were no vitals taken for this visit.    Physical Exam  General-the patient is alert and oriented x 3 and is in no apparent distress.  He is able to speak comfortably in full sentences.  Assessment/Plan        Assessment  Constant pain-unable describe-to the left lower quadrant-May be secondary to acute diverticulitis.  Plan  Begin Augmentin 875 mg p.o. twice daily x 10 days.  I told the patient to push p.o. fluid intake is much as possible.  He will call me in 5 days with his condition    8 minutes spent in patient care

## 2024-12-13 NOTE — PROGRESS NOTES
Subjective   Patient ID: Sergio Newton is a 79 y.o. male who presents for No chief complaint on file..    HPI   The patient reports a history of constant mild pain-unable to describe-of the left lower quadrant x 3 days. He reports an increase in the intensity of the pain when he lifts his legs to lie in bed. He reports a decrease in the intensity of the pain with passage of flatus, defecation. He reports that he did have 1 liquid bowel movement Tuesday evening and 1 liquid bowel movement Wednesday morning prior to the onset of the aforementioned discomfort. He reports no fever, chills, nausea, vomiting, melena, hematochezia, change in urinary habits.   Review of Systems    Objective   There were no vitals taken for this visit.    Physical Exam  General-the patient is alert and oriented x 3 and is in no apparent distress. He is able to speak comfortably in full sentences.   Assessment/Plan        Assessment  Constant pain-unable describe-to the left lower quadrant-May be secondary to acute diverticulitis.  Plan  Begin Augmentin 875 mg p.o. twice daily x 10 days.  I told the patient to push p.o. fluid intake is much as possible.  He will call me in 5 days with his condition     8 minutes spent in patient care

## 2024-12-18 ENCOUNTER — APPOINTMENT (OUTPATIENT)
Dept: PRIMARY CARE | Facility: CLINIC | Age: 79
End: 2024-12-18
Payer: COMMERCIAL

## 2025-02-10 ENCOUNTER — APPOINTMENT (OUTPATIENT)
Dept: DERMATOLOGY | Facility: CLINIC | Age: 80
End: 2025-02-10
Payer: COMMERCIAL

## 2025-02-10 DIAGNOSIS — Z12.83 ENCOUNTER FOR SCREENING FOR MALIGNANT NEOPLASM OF SKIN: ICD-10-CM

## 2025-02-10 DIAGNOSIS — L82.1 SEBORRHEIC KERATOSIS: ICD-10-CM

## 2025-02-10 DIAGNOSIS — D18.01 HEMANGIOMA OF SKIN: ICD-10-CM

## 2025-02-10 DIAGNOSIS — D22.9 MULTIPLE BENIGN NEVI: ICD-10-CM

## 2025-02-10 DIAGNOSIS — Z85.828 PERSONAL HISTORY OF SKIN CANCER: Primary | ICD-10-CM

## 2025-02-10 ASSESSMENT — DERMATOLOGY PATIENT ASSESSMENT
WHERE ARE THESE NEW OR CHANGING LESIONS LOCATED: NECK AND BACK
HAVE YOU HAD OR DO YOU HAVE VASCULAR DISEASE: NO
ARE YOU AN ORGAN TRANSPLANT RECIPIENT: NO
DO YOU USE A TANNING BED: NO
HAVE YOU HAD OR DO YOU HAVE A STAPH INFECTION: NO
DO YOU USE SUNSCREEN: OCCASIONALLY
DO YOU HAVE ANY NEW OR CHANGING LESIONS: YES

## 2025-02-10 ASSESSMENT — DERMATOLOGY QUALITY OF LIFE (QOL) ASSESSMENT
DATE THE QUALITY-OF-LIFE ASSESSMENT WAS COMPLETED: 67246
RATE HOW BOTHERED YOU ARE BY EFFECTS OF YOUR SKIN PROBLEMS ON YOUR ACTIVITIES (EG, GOING OUT, ACCOMPLISHING WHAT YOU WANT, WORK ACTIVITIES OR YOUR RELATIONSHIPS WITH OTHERS): 0 - NEVER BOTHERED
ARE THERE EXCLUSIONS OR EXCEPTIONS FOR THE QUALITY OF LIFE ASSESSMENT: NO
WHAT SINGLE SKIN CONDITION LISTED BELOW IS THE PATIENT ANSWERING THE QUALITY-OF-LIFE ASSESSMENT QUESTIONS ABOUT: NONE OF THE ABOVE
RATE HOW BOTHERED YOU ARE BY SYMPTOMS OF YOUR SKIN PROBLEM (EG, ITCHING, STINGING BURNING, HURTING OR SKIN IRRITATION): 0 - NEVER BOTHERED
RATE HOW EMOTIONALLY BOTHERED YOU ARE BY YOUR SKIN PROBLEM (FOR EXAMPLE, WORRY, EMBARRASSMENT, FRUSTRATION): 0 - NEVER BOTHERED

## 2025-02-10 ASSESSMENT — ITCH NUMERIC RATING SCALE: HOW SEVERE IS YOUR ITCHING?: 0

## 2025-02-10 ASSESSMENT — PATIENT GLOBAL ASSESSMENT (PGA): PATIENT GLOBAL ASSESSMENT: PATIENT GLOBAL ASSESSMENT:  1 - CLEAR

## 2025-02-10 NOTE — PROGRESS NOTES
Subjective     Sergio Newton is a 80 y.o. male who presents for the following: Skin Check (FBSE, area of concern on neck and back. HX of skin cancer).     Review of Systems:  No other skin or systemic complaints other than what is documented elsewhere in the note.    The following portions of the chart were reviewed this encounter and updated as appropriate:         Skin Cancer History  - SCCIS R antihelix 5/2023      Specialty Problems          Dermatology Problems    Hemangioma of skin and subcutaneous tissue    Melanocytic nevi of other parts of face    Melanocytic nevi of trunk    Neoplasm of uncertain behavior of skin    Other hypertrophic disorders of the skin    Other melanin hyperpigmentation    Other seborrheic keratosis    Benign lipomatous neoplasm of skin and subcutaneous tissue of left arm        Objective   Well appearing patient in no apparent distress; mood and affect are within normal limits.    A full examination was performed including scalp, head, eyes, ears, nose, lips, neck, chest, axillae, abdomen, back, buttocks, bilateral upper extremities, bilateral lower extremities, hands, feet, fingers, toes, fingernails, and toenails. All findings within normal limits unless otherwise noted below.    Assessment/Plan   1. Personal history of skin cancer  - No lesions concerning for malignancy on examination today  - Discussed continued sun protection (SPF 30+) and regular self skin exams    2. Encounter for screening for malignant neoplasm of skin    3. Multiple benign nevi    4. Seborrheic keratosis  Stuck on verrucous, tan-brown papules and plaques.      5. Hemangioma of skin  Scattered cherry-red papule(s).      Follow up in 2 years for FBSE or earlier as needed.    Jordi Venegas MD  PGY-2; Department of Dermatology    I was present during all key portions of visit including history, exam, discussion/plan and/or procedures and directly supervised our resident during all portions of the visit,  follow up care, medications and more    Kurtis Bee MD

## 2025-03-06 DIAGNOSIS — N28.1 RENAL CYST: Primary | ICD-10-CM

## 2025-03-19 ENCOUNTER — HOSPITAL ENCOUNTER (OUTPATIENT)
Dept: RADIOLOGY | Facility: CLINIC | Age: 80
Discharge: HOME | End: 2025-03-19
Payer: MEDICARE

## 2025-03-19 ENCOUNTER — LAB (OUTPATIENT)
Dept: LAB | Facility: CLINIC | Age: 80
End: 2025-03-19
Payer: MEDICARE

## 2025-03-19 DIAGNOSIS — N28.1 RENAL CYST: ICD-10-CM

## 2025-03-19 DIAGNOSIS — C61 ADENOCARCINOMA OF PROSTATE (MULTI): ICD-10-CM

## 2025-03-19 LAB — PSA SERPL-MCNC: <0.1 NG/ML

## 2025-03-19 PROCEDURE — 36415 COLL VENOUS BLD VENIPUNCTURE: CPT

## 2025-03-19 PROCEDURE — 76770 US EXAM ABDO BACK WALL COMP: CPT

## 2025-03-19 PROCEDURE — 84153 ASSAY OF PSA TOTAL: CPT

## 2025-03-21 ENCOUNTER — APPOINTMENT (OUTPATIENT)
Dept: RADIOLOGY | Facility: CLINIC | Age: 80
End: 2025-03-21
Payer: MEDICARE

## 2025-03-28 ENCOUNTER — OFFICE VISIT (OUTPATIENT)
Dept: UROLOGY | Facility: CLINIC | Age: 80
End: 2025-03-28
Payer: MEDICARE

## 2025-03-28 DIAGNOSIS — R39.9 URINARY SYMPTOM OR SIGN: ICD-10-CM

## 2025-03-28 DIAGNOSIS — N28.1 RENAL CYST: Primary | ICD-10-CM

## 2025-03-28 DIAGNOSIS — C61 ADENOCARCINOMA OF PROSTATE (MULTI): ICD-10-CM

## 2025-03-28 LAB
POC APPEARANCE, URINE: CLEAR
POC BILIRUBIN, URINE: NEGATIVE
POC BLOOD, URINE: NEGATIVE
POC COLOR, URINE: YELLOW
POC GLUCOSE, URINE: NEGATIVE MG/DL
POC KETONES, URINE: ABNORMAL MG/DL
POC LEUKOCYTES, URINE: NEGATIVE
POC NITRITE,URINE: NEGATIVE
POC PH, URINE: 5.5 PH
POC PROTEIN, URINE: ABNORMAL MG/DL
POC SPECIFIC GRAVITY, URINE: 1.02
POC UROBILINOGEN, URINE: 0.2 EU/DL

## 2025-03-28 PROCEDURE — 1160F RVW MEDS BY RX/DR IN RCRD: CPT | Performed by: NURSE PRACTITIONER

## 2025-03-28 PROCEDURE — 1036F TOBACCO NON-USER: CPT | Performed by: NURSE PRACTITIONER

## 2025-03-28 PROCEDURE — G2211 COMPLEX E/M VISIT ADD ON: HCPCS | Performed by: NURSE PRACTITIONER

## 2025-03-28 PROCEDURE — 99213 OFFICE O/P EST LOW 20 MIN: CPT | Performed by: NURSE PRACTITIONER

## 2025-03-28 PROCEDURE — 1159F MED LIST DOCD IN RCRD: CPT | Performed by: NURSE PRACTITIONER

## 2025-03-28 PROCEDURE — 81003 URINALYSIS AUTO W/O SCOPE: CPT | Performed by: NURSE PRACTITIONER

## 2025-03-28 PROCEDURE — 51798 US URINE CAPACITY MEASURE: CPT | Performed by: NURSE PRACTITIONER

## 2025-03-28 PROCEDURE — 1126F AMNT PAIN NOTED NONE PRSNT: CPT | Performed by: NURSE PRACTITIONER

## 2025-03-28 ASSESSMENT — PAIN SCALES - GENERAL: PAINLEVEL_OUTOF10: 0-NO PAIN

## 2025-03-28 NOTE — PROGRESS NOTES
[facility]  Outpatient Clinic Note    Subjective   Sergio Newton is a 80 y.o. male Annual FUV     History of Present Illness   79 year old male presents for Annual FUV. Previously followed Lynn Kirby CNP. History of Renal cyst, Prostate cancer, Carter 5+4=9, s/p RALP April 2010, Erectile dysfunction, has trialed Viagara, cialis, MAHAD, and Trimix with no significant response. Has some spontaneous erections, not sufficient for penetration, Considered IPP, no longer interested.   Renal cyst stable. Mild ROBB, NTF x 1, not bothersome. No gross hematuria, dysuria, urgency, or straining.     I personally reviewed Renal US 3/19/2025  Left inferior pole renal cyst measuring 1.8 cm with intrinsic  septations likely complex but no solid components, unchanged from  prior. Patient reassured     I personally reviewed Renal Ultrasound from 03/26/2024: Stable mildly complex Bosniak 2 left renal cyst compared to 03/22/2023.  Status post prostatectomy.      Urinalysis today Negative   PVR 10 ml     Lab Results   Component Value Date    PSA <0.10 03/19/2025    PSA <0.10 10/09/2024    PSA <0.10 03/25/2024    PSA <0.10 09/19/2023    PSA <0.10 08/02/2023    PSA <0.10 03/22/2023    PSA <0.10 09/06/2022    PSA <0.10 03/28/2022    PSA <0.10 03/29/2021    PSA <0.10 08/21/2020     Past Medical History and Surgical History   Past Medical History:   Diagnosis Date    Encounter for screening for malignant neoplasm of prostate 02/27/2018    Encounter for prostate cancer screening    Familial hypercholesterolemia 07/30/2021    Essential familial hypercholesterolemia    Obstructive sleep apnea (adult) (pediatric) 07/29/2020    Obstructive sleep apnea    Personal history of colonic polyps 03/05/2019    History of colonic polyps    Personal history of other diseases of the respiratory system 08/01/2022    History of allergic rhinitis     Past Surgical History:   Procedure Laterality Date    FOOT SURGERY  06/21/2013    Foot Surgery     TONSILLECTOMY  06/21/2013    Tonsillectomy       Medications  Current Outpatient Medications on File Prior to Visit   Medication Sig Dispense Refill    aspirin (Ecotrin Low Strength) 81 mg EC tablet Take 1 tablet (81 mg) by mouth once daily.      b complex 0.4 mg tablet Take 1 tablet by mouth once daily.      cholecalciferol (Vitamin D-3) 50 MCG (2000 UT) tablet Take 1 tablet (50 mcg) by mouth once daily.      fexofenadine (Allegra) 180 mg tablet Take 1 tablet (180 mg) by mouth once daily.      fluocinonide (Lidex) 0.05 % ointment Use on red itchy area daily for 2 weeks 60 g 1    fluticasone (Flonase Allergy Relief) 50 mcg/actuation nasal spray Administer 2 sprays into each nostril once daily.      melatonin tablet Take 1 tablet (1 mg) by mouth once daily at bedtime.      montelukast (Singulair) 10 mg tablet Take 1 tablet (10 mg) by mouth once daily.      psyllium husk, aspartame, (Metamucil Sugar-Free, aspart,) 3.4 gram/5.8 gram powder Take by mouth.      psyllium seed (PSYLLIUM ORAL) Take by mouth.      simvastatin (Zocor) 40 mg tablet Take 0.5 tablets (20 mg) by mouth once daily.      triamcinolone (Kenalog) 0.1 % ointment Apply topically 2 times a day. If needed for itch 80 g 1    vitamins A,C,E-zinc-copper (PreserVision AREDS) 2,148 mcg-113 mg-45 mg-17.4mg tablet Take 1 tablet by mouth every 12 hours.      econazole nitrate 1 % cream Apply 1 Application topically twice a day. (Patient not taking: Reported on 3/28/2025)      melatonin 3 mg capsule Take 1 capsule by mouth as needed at bedtime. (Patient not taking: Reported on 3/28/2025)      peg 400-propylene glycol (SYSTANE) 0.4-0.3 % drops ophthalmic drops Administer 1 drop into both eyes twice a day. (Patient not taking: Reported on 3/28/2025)       No current facility-administered medications on file prior to visit.       Objective   Physicial Exam  General: Well developed, well nourished, alert and cooperative, appears in no acute distress  Eyes:  Non-injected conjunctiva, sclera clear, no proptosis  Cardiac: Extremities are warm and well perfused. No edema, cyanosis or pallor.   Lungs: Breathing is easy, non-labored. Speaking in clear and complete sentences. Normal diaphragmatic movement.  MSK: Ambulatory with steady gait, unassisted  Neuro: alert and oriented to person, place and time  Psych: Demonstrates good judgement and reason, without hallucinations, abnormal affect or abnormal behaviors.  Skin: no obvious lesions, no rashes.    Review of Systems  All other systems have been reviewed and are negative for complaint.      Assessment and Plan   Renal cyst stable. Mild ROBB, NTF 1 no gross hematuria, dysuria, urgency, or straining. Mild ROBB, NTF 1 no gross hematuria, dysuria, urgency, or straining.     Kidney Ultrasound from 03/22/2023 which demonstrates Left renal cyst stable since last imaging     I personally reviewed Renal Ultrasound from 03/26/2024: Stable mildly complex Bosniak 2 left renal cyst compared to 03/22/2023.  Status post prostatectomy. Patient reassured.     JANE Score: 1   International Prostate Symptom Score (I-PSS)   I-PSS Total Score: 4   Quality of Life Score: 2     Reviewed Bosniak 2 surveillance recommendations; Imaging every 6-12 months for up to 5 years after the initial diagnosis. Patient elects to monitor annually     Patient will return to clinic in one year with PSA 6 months, and renal ultrasound one year      All questions and concerns were addressed. Patient verbalizes understanding and has no other questions at this time. You are able to have email access to your chart. You can sign into [employer] or add the [employer] delphine on your smart phone to review today's visit, laboratory work and imaging.     Miracle Vaz-- KAJAL CASTILLO  Office Phone:  [phone]

## 2025-04-08 ENCOUNTER — APPOINTMENT (OUTPATIENT)
Dept: UROLOGY | Facility: CLINIC | Age: 80
End: 2025-04-08
Payer: MEDICARE

## 2025-04-17 NOTE — PROGRESS NOTES
[facility]  Hand and Upper Extremity Service  Initial evaluation / Consultation         Chief Complaint: Bilateral hands         80 y.o right hand dominant male presenting for bilateral thumb pain and left middle finger triggering. His symptoms have been present for over a year and seem to fluctuate in severity. He was previously recommended to wear a splint on his left middle finger at night while sleeping to help the triggering and that seemed to help. Several weeks ago he had a flare of his symptoms which prompted him to make this appointment but in the interim, his symptoms have improved to the point that he considered cancelling.            Please refer to New Patient Intake Form scanned into patient's electronic record for self reported past medical history, past surgical history, medications, allergies, family history, social history and 10 point review of systems    Examination:  Constitutional: Oriented to person, place, and time.  Appears well-developed and well-nourished.  Head: Normocephalic and atraumatic.  Eyes: Pupils are equal, round, and reactive to light.  Cardiovascular: Intact distal pulses.  Pulmonary/Chest/Breast: Effort normal. No respiratory distress.  Neurological: Alert and oriented to person, place, and time.  Skin: Skin is warm and dry.  Psychiatric: normal mood and affect.  Behavior is normal.  Musculoskeletal: Bilateral hands reveal minimal degenerative changes. Mild bony prominence at the base of the thumb. No thenar atrophy. tenderness to palpation left middle finger A1 pulley. Demonstrates clear evidence of triggering with terminal finger flexion of the left middle finger but no locking. Positive thumb CMC grind tests bilaterally without MP joint hyperextension instability.        Personal Interpretation of Diagnostic studies: X-rays of bilateral thumbs from 2017 demonstrate early arthritic changes at thumb CMC joints. X-rays of left hand taken  today demonstrate advanced thumb CMC joint arthritis.        Impression:  Bilateral thumb arthritis and left middle finger trigger finger       Plan: We discussed treatment options and provided him comfort cool braces. We recommended anti-inflammatory medications, activity modifications, and topical heat therapies. We also discussed the benefits of injections and surgery if injections fail. He agreed to comfort cool splinting and has declined injections. I am happy to see him again in the future should his symptoms progress to the point that he would consider injections or surgery.       Patient was prescribed a Comfort Cool for CMC arthritis. The patient has weakness, instability and/or deformity of their bilateral thumbs which requires stabilization from this orthosis to improve their function.      Verbal and written instructions for the use, wear schedule, cleaning and application of this item were given.  Patient was instructed that should the brace result in increased pain, decreased sensation, increased swelling, or an overall worsening of their medical condition, to please contact our office immediately.     Orthotic management and training was provided for skin care, modifications due to healing tissues, edema changes, interruption in skin integrity, and safety precautions with the orthosis.        Follow up: As needed               Deo Thornton MD  [facility]  Department of Orthopaedic Surgery  Hand and Upper Extremity Reconstruction      Scribe Attestation  By signing my name below, I, Omari Salgado , Scribe   attest that this documentation has been prepared under the direction and in the presence of Dr. Deo Thornton.      Dictation performed with the use of voice recognition software.  Syntax and grammatical errors may exist.

## 2025-04-18 DIAGNOSIS — M79.642 HAND PAIN, LEFT: Primary | ICD-10-CM

## 2025-04-21 ENCOUNTER — HOSPITAL ENCOUNTER (OUTPATIENT)
Dept: RADIOLOGY | Facility: CLINIC | Age: 80
Discharge: HOME | End: 2025-04-21
Payer: MEDICARE

## 2025-04-21 ENCOUNTER — APPOINTMENT (OUTPATIENT)
Dept: ORTHOPEDIC SURGERY | Facility: CLINIC | Age: 80
End: 2025-04-21
Payer: MEDICARE

## 2025-04-21 VITALS — WEIGHT: 223 LBS | BODY MASS INDEX: 39.51 KG/M2 | HEIGHT: 63 IN

## 2025-04-21 DIAGNOSIS — M19.049 CMC ARTHRITIS: Primary | ICD-10-CM

## 2025-04-21 DIAGNOSIS — M79.642 HAND PAIN, LEFT: ICD-10-CM

## 2025-04-21 PROCEDURE — L3924 HFO WITHOUT JOINTS PRE OTS: HCPCS | Performed by: ORTHOPAEDIC SURGERY

## 2025-04-21 PROCEDURE — 99213 OFFICE O/P EST LOW 20 MIN: CPT | Performed by: ORTHOPAEDIC SURGERY

## 2025-04-21 PROCEDURE — 73130 X-RAY EXAM OF HAND: CPT | Mod: LEFT SIDE | Performed by: RADIOLOGY

## 2025-04-21 PROCEDURE — 73130 X-RAY EXAM OF HAND: CPT | Mod: LT

## 2025-04-21 PROCEDURE — 1159F MED LIST DOCD IN RCRD: CPT | Performed by: ORTHOPAEDIC SURGERY

## 2025-04-21 PROCEDURE — 1036F TOBACCO NON-USER: CPT | Performed by: ORTHOPAEDIC SURGERY

## 2025-04-21 ASSESSMENT — PAIN DESCRIPTION - DESCRIPTORS: DESCRIPTORS: ACHING;SORE

## 2025-04-21 ASSESSMENT — PAIN SCALES - GENERAL: PAINLEVEL_OUTOF10: 5 - MODERATE PAIN

## 2025-04-21 ASSESSMENT — PAIN - FUNCTIONAL ASSESSMENT: PAIN_FUNCTIONAL_ASSESSMENT: 0-10

## 2025-04-21 NOTE — LETTER
April 21, 2025     Luis Rose MD  [address] [facility] [address]    Patient: Sergio Newton   YOB: 1945   Date of Visit: 4/21/2025       Dear Dr. Luis Rose MD:    Thank you for referring Sergio Newton to me for evaluation. Below are my notes for this consultation.  If you have questions, please do not hesitate to call me. I look forward to following your patient along with you.       Sincerely,     Deo Thornton MD      CC: No Recipients  ______________________________________________________________________________________    [facility]  Hand and Upper Extremity Service  Initial evaluation / Consultation         Chief Complaint: Bilateral hands         80 y.o right hand dominant male presenting for bilateral thumb pain and left middle finger triggering. His symptoms have been present for over a year and seem to fluctuate in severity. He was previously recommended to wear a splint on his left middle finger at night while sleeping to help the triggering and that seemed to help. Several weeks ago he had a flare of his symptoms which prompted him to make this appointment but in the interim, his symptoms have improved to the point that he considered cancelling.            Please refer to New Patient Intake Form scanned into patient's electronic record for self reported past medical history, past surgical history, medications, allergies, family history, social history and 10 point review of systems    Examination:  Constitutional: Oriented to person, place, and time.  Appears well-developed and well-nourished.  Head: Normocephalic and atraumatic.  Eyes: Pupils are equal, round, and reactive to light.  Cardiovascular: Intact distal pulses.  Pulmonary/Chest/Breast: Effort normal. No respiratory distress.  Neurological: Alert and oriented to person, place, and time.  Skin: Skin is warm and dry.  Psychiatric: normal mood and affect.  Behavior is  normal.  Musculoskeletal: Bilateral hands reveal minimal degenerative changes. Mild bony prominence at the base of the thumb. No thenar atrophy. tenderness to palpation left middle finger A1 pulley. Demonstrates clear evidence of triggering with terminal finger flexion of the left middle finger but no locking. Positive thumb CMC grind tests bilaterally without MP joint hyperextension instability.        Personal Interpretation of Diagnostic studies: X-rays of bilateral thumbs from 2017 demonstrate early arthritic changes at thumb CMC joints. X-rays of left hand taken today demonstrate advanced thumb CMC joint arthritis.        Impression:  Bilateral thumb arthritis and left middle finger trigger finger       Plan: We discussed treatment options and provided him comfort cool braces. We recommended anti-inflammatory medications, activity modifications, and topical heat therapies. We also discussed the benefits of injections and surgery if injections fail. He agreed to comfort cool splinting and has declined injections. I am happy to see him again in the future should his symptoms progress to the point that he would consider injections or surgery.       Patient was prescribed a Comfort Cool for CMC arthritis. The patient has weakness, instability and/or deformity of their bilateral thumbs which requires stabilization from this orthosis to improve their function.      Verbal and written instructions for the use, wear schedule, cleaning and application of this item were given.  Patient was instructed that should the brace result in increased pain, decreased sensation, increased swelling, or an overall worsening of their medical condition, to please contact our office immediately.     Orthotic management and training was provided for skin care, modifications due to healing tissues, edema changes, interruption in skin integrity, and safety precautions with the orthosis.        Follow up: As needed               Deo SILVER  MD Hillary  [facility]  Department of Orthopaedic Surgery  Hand and Upper Extremity Reconstruction      Scribe Attestation  By signing my name below, I, Susi Moreno   attest that this documentation has been prepared under the direction and in the presence of Dr. Deo Thornton.      Dictation performed with the use of voice recognition software.  Syntax and grammatical errors may exist.

## 2025-08-06 NOTE — PROGRESS NOTES
Subjective   Reason for Visit: Sergio Newton is an 80 y.o. male here for a Medicare Wellness visit.               HPI  Over the past year, the patient reports a history of daily morning episodes of aching pain over the entire head.  He reports that he will wake up with the pain and that the pain resolves after breakfast.  He reports no precipitating or exacerbating factors..  He reports associated rhinorrhea.  He reports no associated visual changes, slurred speech, focal weakness/paresthesias, nausea, vomiting, phonophobia, photophobia.  [facility]..  He also was told that he had earwax in both ears.  [facility]    Over the past year, the patient reports a history of increased fatigue.  He reports no other associated symptoms.    Over the past several years, the patient reports a history of intermittent episodes of worsening of balance.  He reports no associated symptoms.    Over the past year, the patient reports no episodes of pain in the left elbow region.    Over the past year, the patient reports continued chronic intermittent episodes of sharp or aching pain noted at the CMC joints of both thumbs.  He reports that the episodes can be precipitated by certain movements.  He reports no radiation of discomfort.  He reports no other associated symptoms.  Over the past few months, he reports a decrease in the frequency and intensity of the episodes.    He reports that the pruritic rash located over the ventral surface of the left forearm disappeared soon after his annual Medicare wellness visit.  Over the past year, he reports the continued chronic presence of a mass located over the ventral surface midportion of the right forearm-unchanged    No other new complaints.      Patient Care Team:  Luis Rose MD as PCP - General (Internal Medicine)  Luis Rose MD as PCP - [employer]  PCP     Review of Systems  All systems have been reviewed and are normal except as previously noted  Objective   Vitals:  There were no vitals taken for this visit.      Physical Exam  Head-palpation revealed no tenderness over the maxillary or frontal sinuses  Eyes-extraocular movements intact ;pupils equal and reactive to light; fundi not well visualized.   Ears-palpation of pinnas and tragus revealed no tenderness. External auditory canals not erythematous or swollen.  Right TM and left TM not visualized secondary to impacted cerumen..   Nose-turbinates not erythematous or swollen; no septal deviation noted  Mouth-posterior pharynx is not erythematous or swollen. Tonsillar pillars appeared normal no exudates  Neck no lymphadenopathy. Thyroid gland not enlarged. , No bruits  Breasts-bilateral gynecomastia noted.  Lungs-clear to auscultation bilaterally  Cardiac-rate normal,, rhythm regular no murmurs no JVD  Abdomen-soft ,obese; normal active bowel sounds. There is a 2 x 2 centimeter mass located in the umbilicus. There is a 10 x 10 cm mass located in the epigastrium. Palpation of both regions revealed no tenderness. No tenderness or masses noted throughout the rest of the abdomen. Liver percussed to 9 cm in total span  Pulses 2+ bilaterally in the upper extremity; 0 bilaterally in the lower extremities  Extremities-no peripheral edema  Musculoskeletal  Cervical spine-no erythema or swelling. Decreased range of motion with no pain on extension 30 degrees.;  Full range of motion with no pain noted in all other directions of motion.. Palpation revealed no tenderness or increase in warmth.    CMC joint right thumb-  no erythema or swelling, Full range of motion with twinging noted on flexion, extension, adduction, abduction..  Full range of motion with no pain noted on opposition. Palpation revealed no tenderness or increase in warmth.  CMC joint left thumb-no erythema or swelling, Full range of motion with twinging  on flexion, extension, abduction, adduction.  Full range of motion with no pain noted on opposition palpation revealed no tenderness or increase in warmth  Left hip-no erythema or swelling. Windshield wiper test negative. Full range of motion in all directions of motion with no pain. Palpation revealed no tenderness or increase in warmth     Skin-varicose veins noted in the lower extremities bilaterally.  There is a 6 x 6 cm mass located over the lateral surface of the left shoulder.  Palpation revealed the mass to be nonmovable, nontender, no increase in warmth  There is a 2 x 2 cm nodule located midportion ventral surface of the right forearm.  Palpation revealed the nodule to be slightly movable, nontender, no increase in warmth.    Neurologic  Mental status-alert and oriented x3   Cranial nerves-2 through 12 grossly intact, no visual field abnormalities  Motor-no pronator drift noted, strength-5/5 in all muscle groups tested, , no tremor noted.  No bradykinesia noted.  No rigidity noted.  Negative pull test  Sensory-Light touch sensation fully intact  Pinprick sensation fully intact  Vibratory sensation fully intact.  Cerebellar-no truncal ataxia, good coordination finger-nose testing,, good coordination heel-to-shin testing, normal rapid alternating movements  Slightly positive Romberg, moderately decreased coordination in tandem gait,   Reflexes-1+/4 bilaterally  Assessment/Plan   Assessment & Plan  Routine general medical examination at a health care facility    Orders:    POCT UA (nonautomated) manually resulted    ECG 12 Lead    Adenocarcinoma of prostate (Multi)         Elevated coronary artery calcium score         Dysmetabolic syndrome         Morbid obesity (Multi)    Orders:    tirzepatide (Mounjaro) 2.5 mg/0.5 mL pen injector; Inject 2.5 mg under the skin 1 (one) time per week.    Bilateral impacted cerumen    Orders:    Referral to ENT; Future    Forearm mass, right    Orders:    Referral to Plastic  Surgery; Future             Assessment.  Elevated coronary calcium score.  COVID-19 June 2022    Chronic intermittent episodes of rhinorrhea,-likely secondary to allergic rhinitis again probably secondary to dust exposure because of the construction that has been occurring in the patient's home.  Allergic rhinitis  Gallstones  Chronic presence of mass in the umbilical region in the epigastrium likely represent hernias  Umbilical hernia  Ventral hernia  Diverticulitis February 2003,? November 2016  Colonic polyps  Chronic intermittent episodes of urinary incontinence only occurring when standing up quickly or lifting-likely secondary to stress incontinence secondary to the patient's prostatectomy  Erectile dysfunction manifested as chronic inability to achieve erections during and in the absence of intercourse- likely secondary to patient's radical prostatectomy  Prostate cancer status post radical prostatectomy April 2010  Hemorrhagic cyst left kidney  Rotator cuff disease right shoulder  Chronic intermittent episodes of sharp or aching pain noted at the CMC joints of both thumbs-may be secondary to osteoarthritis.  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 May be secondary to left hip greater trochanteric bursitis,  Left hip greater trochanteric bursitis diagnosed June 2022  Osteoarthritis of the right knee.  Chronic intermittent episodes of cramping pain in either calf region-may represent idiopathic nocturnal leg cramps  Plantar fasciitis right heel; right heel bursitis December 1, 2023  Vitamin D deficiency  Hypercholesterolemia.    Chronic presence of a mass located over the ventral surface midportion of the right forearm-may represent a sebaceous cyst?  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  May be secondary to central canal stenosis, bilateral neuroforaminal stenosis cervical spine  Chronic intermittent episodes of aching pain and stiffness noted over the posterior surface of the neck-may be secondary to osteoarthritis and degenerative disc disease of the cervical spine, cervical spinal stenosis  Chronic intermittent episodes of, grinding and popping sounds in the neck likely secondary to cervical spinal stenosis, osteoarthritis and degenerative disc disease of the cervical spine  Central canal stenosis C4-C5, C6-C7  Bilateral neuroforaminal stenosis C4-C5, C5-C6, C6-C7  Chronic fatigue-may be secondary to inadequate settings on CPAP device.. Depression may be a contributing factor..   Obstructive sleep apnea  Chronic insomnia manifested as frequent awakenings-probably secondary to depression, PTSD  PTSD  Depression     Plan  Obtain EKG , urinalysis CBC differential CMP fasting lipid profile vitamin D level, vitamin B12 level, PSA, TSH, as soon as possible.  Refer patient to ENT for removal of impacted cerumen  Refer patient to plastic surgery for further evaluation and treatment of the aforementioned mass located over the ventral surface midportion of the right forearm  I again have recommended that the patient begin a physical therapy program  I have prescribed Mounjaro 2.5 mg weekly  I recommended that the patient increase his melatonin dosage to 2 mg at night  The patient will continue all of his other  current medications  He will return for an annual Medicare wellness visit in 1 year

## 2025-08-07 ENCOUNTER — APPOINTMENT (OUTPATIENT)
Dept: PRIMARY CARE | Facility: CLINIC | Age: 80
End: 2025-08-07
Payer: COMMERCIAL

## 2025-08-07 VITALS
SYSTOLIC BLOOD PRESSURE: 114 MMHG | WEIGHT: 228.6 LBS | BODY MASS INDEX: 40.49 KG/M2 | HEART RATE: 66 BPM | DIASTOLIC BLOOD PRESSURE: 70 MMHG

## 2025-08-07 DIAGNOSIS — R22.31 FOREARM MASS, RIGHT: ICD-10-CM

## 2025-08-07 DIAGNOSIS — R93.1 ELEVATED CORONARY ARTERY CALCIUM SCORE: ICD-10-CM

## 2025-08-07 DIAGNOSIS — E88.810 DYSMETABOLIC SYNDROME: ICD-10-CM

## 2025-08-07 DIAGNOSIS — E66.01 MORBID OBESITY (MULTI): ICD-10-CM

## 2025-08-07 DIAGNOSIS — Z00.00 ROUTINE GENERAL MEDICAL EXAMINATION AT A HEALTH CARE FACILITY: Primary | ICD-10-CM

## 2025-08-07 DIAGNOSIS — H61.23 BILATERAL IMPACTED CERUMEN: ICD-10-CM

## 2025-08-07 DIAGNOSIS — C61 ADENOCARCINOMA OF PROSTATE (MULTI): ICD-10-CM

## 2025-08-07 LAB
POC APPEARANCE, URINE: CLEAR
POC BILIRUBIN, URINE: NEGATIVE
POC BLOOD, URINE: NEGATIVE
POC COLOR, URINE: YELLOW
POC GLUCOSE, URINE: NEGATIVE MG/DL
POC KETONES, URINE: NEGATIVE MG/DL
POC LEUKOCYTES, URINE: NEGATIVE
POC NITRITE,URINE: NEGATIVE
POC PH, URINE: 7 PH
POC PROTEIN, URINE: ABNORMAL MG/DL
POC SPECIFIC GRAVITY, URINE: 1.02
POC UROBILINOGEN, URINE: 0.2 EU/DL

## 2025-08-07 PROCEDURE — 93000 ELECTROCARDIOGRAM COMPLETE: CPT | Performed by: INTERNAL MEDICINE

## 2025-08-07 PROCEDURE — 1170F FXNL STATUS ASSESSED: CPT | Performed by: INTERNAL MEDICINE

## 2025-08-07 PROCEDURE — 81002 URINALYSIS NONAUTO W/O SCOPE: CPT | Performed by: INTERNAL MEDICINE

## 2025-08-07 PROCEDURE — 1160F RVW MEDS BY RX/DR IN RCRD: CPT | Performed by: INTERNAL MEDICINE

## 2025-08-07 PROCEDURE — 1159F MED LIST DOCD IN RCRD: CPT | Performed by: INTERNAL MEDICINE

## 2025-08-07 PROCEDURE — 99214 OFFICE O/P EST MOD 30 MIN: CPT | Performed by: INTERNAL MEDICINE

## 2025-08-07 PROCEDURE — G0439 PPPS, SUBSEQ VISIT: HCPCS | Performed by: INTERNAL MEDICINE

## 2025-08-07 RX ORDER — TIRZEPATIDE 2.5 MG/.5ML
2.5 INJECTION, SOLUTION SUBCUTANEOUS WEEKLY
Qty: 2 ML | Refills: 1 | Status: SHIPPED | OUTPATIENT
Start: 2025-08-07

## 2025-08-07 ASSESSMENT — ACTIVITIES OF DAILY LIVING (ADL)
TAKING_MEDICATION: INDEPENDENT
DOING_HOUSEWORK: INDEPENDENT
MANAGING_FINANCES: INDEPENDENT
DRESSING: INDEPENDENT
GROCERY_SHOPPING: INDEPENDENT
BATHING: INDEPENDENT

## 2025-08-07 ASSESSMENT — ENCOUNTER SYMPTOMS
LOSS OF SENSATION IN FEET: 0
DEPRESSION: 0
OCCASIONAL FEELINGS OF UNSTEADINESS: 0

## 2025-08-27 ENCOUNTER — APPOINTMENT (OUTPATIENT)
Dept: OTOLARYNGOLOGY | Facility: CLINIC | Age: 80
End: 2025-08-27
Payer: MEDICARE

## 2025-08-27 VITALS — WEIGHT: 228 LBS | BODY MASS INDEX: 40.39 KG/M2

## 2025-08-27 DIAGNOSIS — H61.23 EXCESSIVE CERUMEN IN BOTH EAR CANALS: Primary | ICD-10-CM

## 2025-08-27 PROCEDURE — 1036F TOBACCO NON-USER: CPT | Performed by: GENERAL PRACTICE

## 2025-08-27 PROCEDURE — 99203 OFFICE O/P NEW LOW 30 MIN: CPT | Performed by: GENERAL PRACTICE

## 2025-08-27 PROCEDURE — 1159F MED LIST DOCD IN RCRD: CPT | Performed by: GENERAL PRACTICE

## 2025-08-27 PROCEDURE — 69210 REMOVE IMPACTED EAR WAX UNI: CPT | Performed by: GENERAL PRACTICE

## 2025-09-08 ENCOUNTER — APPOINTMENT (OUTPATIENT)
Dept: DERMATOLOGY | Facility: CLINIC | Age: 80
End: 2025-09-08
Payer: MEDICARE

## 2026-01-08 ENCOUNTER — APPOINTMENT (OUTPATIENT)
Dept: PLASTIC SURGERY | Facility: CLINIC | Age: 81
End: 2026-01-08
Payer: MEDICARE

## 2026-04-09 ENCOUNTER — APPOINTMENT (OUTPATIENT)
Dept: UROLOGY | Facility: CLINIC | Age: 81
End: 2026-04-09
Payer: MEDICARE

## 2026-08-26 ENCOUNTER — APPOINTMENT (OUTPATIENT)
Dept: PRIMARY CARE | Facility: CLINIC | Age: 81
End: 2026-08-26
Payer: MEDICARE